# Patient Record
Sex: FEMALE | Race: WHITE | NOT HISPANIC OR LATINO | Employment: OTHER | URBAN - METROPOLITAN AREA
[De-identification: names, ages, dates, MRNs, and addresses within clinical notes are randomized per-mention and may not be internally consistent; named-entity substitution may affect disease eponyms.]

---

## 2017-03-13 ENCOUNTER — TELEPHONE (OUTPATIENT)
Dept: FAMILY MEDICINE | Facility: CLINIC | Age: 57
End: 2017-03-13

## 2017-03-13 NOTE — TELEPHONE ENCOUNTER
----- Message from Radha Ridley sent at 3/13/2017 12:42 PM CDT -----  Contact: Patient  Patient called to schedule an appointment; but she hasn't been there since 2013 so she would be considered a new patient. I advised her no providers in [place] are accepting new patients; she wants to know if Dr. Sepulveda can see her and if not then that's not good and she stated that her  comes to [place] too and she might just tell him to stop going there too.     She can be contacted at [phone].    Thanks,  Radha

## 2017-03-13 NOTE — TELEPHONE ENCOUNTER
Adv pt that she has not been seen in past 3 years and would be a new patient. Pt informed that Dr. Sepulveda is not taking new patients at this time.

## 2019-08-20 ENCOUNTER — OFFICE VISIT (OUTPATIENT)
Dept: FAMILY MEDICINE | Facility: CLINIC | Age: 59
End: 2019-08-20
Payer: COMMERCIAL

## 2019-08-20 VITALS
SYSTOLIC BLOOD PRESSURE: 138 MMHG | HEIGHT: 63 IN | WEIGHT: 175.81 LBS | DIASTOLIC BLOOD PRESSURE: 83 MMHG | HEART RATE: 86 BPM | BODY MASS INDEX: 31.15 KG/M2 | TEMPERATURE: 98 F

## 2019-08-20 DIAGNOSIS — H92.01 RIGHT EAR PAIN: Primary | ICD-10-CM

## 2019-08-20 DIAGNOSIS — Z00.01 ENCOUNTER FOR GENERAL ADULT MEDICAL EXAMINATION WITH ABNORMAL FINDINGS: ICD-10-CM

## 2019-08-20 DIAGNOSIS — Z12.31 ENCOUNTER FOR SCREENING MAMMOGRAM FOR BREAST CANCER: ICD-10-CM

## 2019-08-20 DIAGNOSIS — H66.001 RIGHT ACUTE SUPPURATIVE OTITIS MEDIA: ICD-10-CM

## 2019-08-20 DIAGNOSIS — Z13.6 ENCOUNTER FOR LIPID SCREENING FOR CARDIOVASCULAR DISEASE: ICD-10-CM

## 2019-08-20 DIAGNOSIS — Z12.11 ENCOUNTER FOR SCREENING COLONOSCOPY: ICD-10-CM

## 2019-08-20 DIAGNOSIS — Z13.220 ENCOUNTER FOR LIPID SCREENING FOR CARDIOVASCULAR DISEASE: ICD-10-CM

## 2019-08-20 DIAGNOSIS — Z11.59 NEED FOR HEPATITIS C SCREENING TEST: ICD-10-CM

## 2019-08-20 LAB
BILIRUB UR QL STRIP: NEGATIVE
CLARITY UR: CLEAR
COLOR UR: YELLOW
GLUCOSE UR QL STRIP: NEGATIVE
HGB UR QL STRIP: NEGATIVE
KETONES UR QL STRIP: NEGATIVE
LEUKOCYTE ESTERASE UR QL STRIP: NEGATIVE
NITRITE UR QL STRIP: NEGATIVE
PH UR STRIP: 6 [PH] (ref 5–8)
PROT UR QL STRIP: NEGATIVE
SP GR UR STRIP: 1.02 (ref 1–1.03)
URN SPEC COLLECT METH UR: NORMAL

## 2019-08-20 PROCEDURE — 3008F BODY MASS INDEX DOCD: CPT | Mod: CPTII,S$GLB,, | Performed by: FAMILY MEDICINE

## 2019-08-20 PROCEDURE — 99203 OFFICE O/P NEW LOW 30 MIN: CPT | Mod: S$GLB,,, | Performed by: FAMILY MEDICINE

## 2019-08-20 PROCEDURE — 99999 PR PBB SHADOW E&M-EST. PATIENT-LVL III: ICD-10-PCS | Mod: PBBFAC,,, | Performed by: FAMILY MEDICINE

## 2019-08-20 PROCEDURE — 99999 PR PBB SHADOW E&M-EST. PATIENT-LVL III: CPT | Mod: PBBFAC,,, | Performed by: FAMILY MEDICINE

## 2019-08-20 PROCEDURE — 81002 URINALYSIS NONAUTO W/O SCOPE: CPT | Mod: PO

## 2019-08-20 PROCEDURE — 3008F PR BODY MASS INDEX (BMI) DOCUMENTED: ICD-10-PCS | Mod: CPTII,S$GLB,, | Performed by: FAMILY MEDICINE

## 2019-08-20 PROCEDURE — 99203 PR OFFICE/OUTPT VISIT, NEW, LEVL III, 30-44 MIN: ICD-10-PCS | Mod: S$GLB,,, | Performed by: FAMILY MEDICINE

## 2019-08-20 RX ORDER — PROMETHAZINE HYDROCHLORIDE 25 MG/1
25 TABLET ORAL EVERY 6 HOURS PRN
Qty: 6 TABLET | Refills: 0 | Status: SHIPPED | OUTPATIENT
Start: 2019-08-20 | End: 2019-09-16

## 2019-08-20 RX ORDER — AMOXICILLIN 500 MG/1
500 CAPSULE ORAL EVERY 12 HOURS
Qty: 20 CAPSULE | Refills: 0 | Status: SHIPPED | OUTPATIENT
Start: 2019-08-20 | End: 2019-08-30

## 2019-08-20 RX ORDER — SODIUM, POTASSIUM,MAG SULFATES 17.5-3.13G
SOLUTION, RECONSTITUTED, ORAL ORAL
Qty: 354 ML | Refills: 0 | Status: SHIPPED | OUTPATIENT
Start: 2019-08-20 | End: 2019-10-17

## 2019-08-20 NOTE — PROGRESS NOTES
Subjective:      Patient ID: Jeannette Rahman is a 59 y.o. female.    Chief Complaint: Establish Care and Otalgia      Problem List Items Addressed This Visit     Encounter for screening colonoscopy    Overview     Due for colonoscopy         Encounter for screening mammogram for breast cancer    Overview     DUE for breast cancer screening         Need for hepatitis C screening test    Overview     Born in 1960         Encounter for lipid screening for cardiovascular disease    Overview     Lipid screening.         Encounter for general adult medical examination with abnormal findings    Overview     Will obtain labs this visit and follow-up in 2 weeks for wellness exam.         Right ear pain - Primary    Overview     Acute.  Worsening.  Mild to moderate.  No improvement with over-the-counter.         Current Assessment & Plan     Otitis media on exam.  Treating with antibiotics.  Follow-up if no improvement or worsening.         Right acute suppurative otitis media    Overview     Acute problem.  Right ear pain. Associated with decreased hearing.  Denies any fever chills.  Associated with headache.  Over-the-counter medications have not helped.  Patient has not been evaluated or treated for this condition.  Worsening.  Pain is mild to moderate         Current Assessment & Plan     Treat with amoxicillin.  Advised to use Flonase and amoxicillin for upper respiratory symptoms.  Anti-inflammatory or Tylenol for pain fever.  ER precautions were given.                Past Medical History:  Past Medical History:   Diagnosis Date    Arthritis     Colon polyp      Past Surgical History:   Procedure Laterality Date    bilateral carpal tunnel release      COLONOSCOPY  12/17/2013         HIP SURGERY      JOINT REPLACEMENT       Review of patient's allergies indicates:   Allergen Reactions    Sulfa (sulfonamide antibiotics)      Medication List with Changes/Refills   New Medications    AMOXICILLIN (AMOXIL) 500 MG  CAPSULE    Take 1 capsule (500 mg total) by mouth every 12 (twelve) hours. for 10 days    PROMETHAZINE (PHENERGAN) 25 MG TABLET    Take 1 tablet (25 mg total) by mouth every 6 (six) hours as needed for Nausea.    SODIUM,POTASSIUM,MAG SULFATES (SUPREP BOWEL PREP KIT) 17.5-3.13-1.6 GRAM SOLR    Take as instructed on prep sheet   Discontinued Medications    DULOXETINE (CYMBALTA) 30 MG CAPSULE    Take 1 capsule (30 mg total) by mouth 3 (three) times daily.    HYDROCODONE-ACETAMINOPHEN  (LORTAB)  MG PER TABLET        LOVASTATIN (MEVACOR) 10 MG TABLET    TAKE ONE TABLET BY MOUTH EVERY EVENING    OXYCODONE-ACETAMINOPHEN 5-325 MG (PERCOCET) 5-325 MG PER TABLET        TRAMADOL (ULTRAM) 50 MG TABLET    Take 1 tablet (50 mg total) by mouth every 8 (eight) hours as needed for Pain.    WARFARIN (COUMADIN) 5 MG TABLET    Take 1 tablet (5 mg total) by mouth once.      Social History     Tobacco Use    Smoking status: Former Smoker     Years: 2.00    Smokeless tobacco: Never Used   Substance Use Topics    Alcohol use: Yes     Comment: socially      Family History   Problem Relation Age of Onset    Anesthesia problems Neg Hx     Clotting disorder Neg Hx     Alzheimer's disease Mother     Heart disease Father        I have reviewed the complete PMH, social history, surgical history, allergies and medications.  As well as family history.    Review of Systems   Constitutional: Negative for activity change and unexpected weight change.   HENT: Positive for ear pain, hearing loss and postnasal drip. Negative for ear discharge, rhinorrhea and trouble swallowing.    Eyes: Negative for discharge and visual disturbance.   Respiratory: Negative for chest tightness and wheezing.    Cardiovascular: Negative for chest pain and palpitations.   Gastrointestinal: Negative for blood in stool, constipation, diarrhea and vomiting.   Endocrine: Negative for polydipsia and polyuria.   Genitourinary: Negative for difficulty urinating,  "dysuria, hematuria and menstrual problem.   Musculoskeletal: Negative for arthralgias, joint swelling and neck pain.   Neurological: Negative for weakness and headaches.   Psychiatric/Behavioral: Negative for confusion and dysphoric mood.       Objective:     /83   Pulse 86   Temp 98.3 °F (36.8 °C) (Oral)   Ht 5' 3" (1.6 m)   Wt 79.7 kg (175 lb 12.8 oz)   BMI 31.14 kg/m²     Physical Exam   Constitutional: She is oriented to person, place, and time. She appears well-developed and well-nourished. No distress.   HENT:   Head: Normocephalic and atraumatic.   Right Ear: There is tenderness. Tympanic membrane is injected, scarred, erythematous and bulging. Tympanic membrane is not perforated and not retracted. A middle ear effusion is present. Decreased hearing is noted.   Left Ear: No tenderness. Tympanic membrane is scarred. Tympanic membrane is not injected and not erythematous. A middle ear effusion is present.   Eyes: Pupils are equal, round, and reactive to light. EOM are normal.   Neck: Normal range of motion. Neck supple.   Cardiovascular: Normal rate, regular rhythm, normal heart sounds and intact distal pulses.   No murmur heard.  Pulmonary/Chest: Effort normal and breath sounds normal. No respiratory distress. She has no wheezes.   Abdominal: Soft. Bowel sounds are normal. She exhibits no distension. There is no tenderness.   Musculoskeletal: Normal range of motion. She exhibits no edema.   Neurological: She is alert and oriented to person, place, and time. No cranial nerve deficit.   Skin: Skin is warm and dry. Capillary refill takes less than 2 seconds.   Psychiatric: She has a normal mood and affect. Her behavior is normal.   Nursing note and vitals reviewed.      Assessment:     1. Right ear pain    2. Right acute suppurative otitis media    3. Encounter for screening mammogram for breast cancer    4. Need for hepatitis C screening test    5. Encounter for lipid screening for cardiovascular " disease    6. Encounter for screening colonoscopy    7. Encounter for general adult medical examination with abnormal findings        Plan:     I have Reviewed and summarized old records.  I have performed thorough medication reconciliation today and discussed risk and benefits of each medication.  I have ordered labs and discussed with patient.  All questions were answered.  I am requesting old records and will review them once they are available.    Problem List Items Addressed This Visit     Encounter for screening colonoscopy    Relevant Medications    promethazine (PHENERGAN) 25 MG tablet    sodium,potassium,mag sulfates (SUPREP BOWEL PREP KIT) 17.5-3.13-1.6 gram SolR    Other Relevant Orders    Case request GI: COLONOSCOPY (Completed)    Encounter for screening mammogram for breast cancer    Relevant Orders    Mammo Digital Screening Bilat with CAD    Need for hepatitis C screening test    Relevant Orders    Hepatitis C antibody    Encounter for lipid screening for cardiovascular disease    Relevant Orders    Lipid panel (Completed)    Encounter for general adult medical examination with abnormal findings    Relevant Orders    CBC auto differential (Completed)    Comprehensive metabolic panel (Completed)    Hemoglobin A1c (Completed)    TSH (Completed)    T3, free (Completed)    T4 (Completed)    URINALYSIS (Completed)    Right ear pain - Primary     Otitis media on exam.  Treating with antibiotics.  Follow-up if no improvement or worsening.         Right acute suppurative otitis media     Treat with amoxicillin.  Advised to use Flonase and amoxicillin for upper respiratory symptoms.  Anti-inflammatory or Tylenol for pain fever.  ER precautions were given.         Relevant Medications    amoxicillin (AMOXIL) 500 MG capsule          Follow up in about 2 weeks (around 9/3/2019), or if symptoms worsen or fail to improve, for Lab results .    If no improvement in symptoms or symptoms worsen, advised to  call/follow-up at clinic or go to ER. Patient voiced understanding and all questions/concerns were addressed.     DISCLAIMER: This note was compiled by using a speech recognition dictation system and therefore please be aware that typographical / speech recognition errors can and do occur.  Please contact me if you see any errors specifically.    Rick Vivas MD  We Offer Telehealth & Same Day Appointments!   Book your Telehealth appointment with me through my nurse or   Clinic appointments on [employer]!    Office: [phone]          Check out my Facebook Page and Follow Me at: CLICK HERE    Check out my website at [employer] by clicking on: CLICK HERE    To Schedule appointments online, go to [employer]: CLICK HERE     Location: [email or web address]    [address] [place] [address]    FAX: [phone]

## 2019-08-20 NOTE — PATIENT INSTRUCTIONS

## 2019-08-21 ENCOUNTER — LAB VISIT (OUTPATIENT)
Dept: LAB | Facility: HOSPITAL | Age: 59
End: 2019-08-21
Attending: FAMILY MEDICINE
Payer: COMMERCIAL

## 2019-08-21 DIAGNOSIS — Z00.01 ENCOUNTER FOR GENERAL ADULT MEDICAL EXAMINATION WITH ABNORMAL FINDINGS: ICD-10-CM

## 2019-08-21 DIAGNOSIS — Z13.220 ENCOUNTER FOR LIPID SCREENING FOR CARDIOVASCULAR DISEASE: ICD-10-CM

## 2019-08-21 DIAGNOSIS — Z11.59 NEED FOR HEPATITIS C SCREENING TEST: ICD-10-CM

## 2019-08-21 DIAGNOSIS — Z13.6 ENCOUNTER FOR LIPID SCREENING FOR CARDIOVASCULAR DISEASE: ICD-10-CM

## 2019-08-21 LAB
ALBUMIN SERPL BCP-MCNC: 3.8 G/DL (ref 3.5–5.2)
ALP SERPL-CCNC: 86 U/L (ref 55–135)
ALT SERPL W/O P-5'-P-CCNC: 41 U/L (ref 10–44)
ANION GAP SERPL CALC-SCNC: 9 MMOL/L (ref 8–16)
AST SERPL-CCNC: 24 U/L (ref 10–40)
BASOPHILS # BLD AUTO: 0.07 K/UL (ref 0–0.2)
BASOPHILS NFR BLD: 0.9 % (ref 0–1.9)
BILIRUB SERPL-MCNC: 0.4 MG/DL (ref 0.1–1)
BUN SERPL-MCNC: 12 MG/DL (ref 6–20)
CALCIUM SERPL-MCNC: 10.1 MG/DL (ref 8.7–10.5)
CHLORIDE SERPL-SCNC: 106 MMOL/L (ref 95–110)
CHOLEST SERPL-MCNC: 329 MG/DL (ref 120–199)
CHOLEST/HDLC SERPL: 7.5 {RATIO} (ref 2–5)
CO2 SERPL-SCNC: 27 MMOL/L (ref 23–29)
CREAT SERPL-MCNC: 0.8 MG/DL (ref 0.5–1.4)
DIFFERENTIAL METHOD: ABNORMAL
EOSINOPHIL # BLD AUTO: 0.1 K/UL (ref 0–0.5)
EOSINOPHIL NFR BLD: 1.7 % (ref 0–8)
ERYTHROCYTE [DISTWIDTH] IN BLOOD BY AUTOMATED COUNT: 12.5 % (ref 11.5–14.5)
EST. GFR  (AFRICAN AMERICAN): >60 ML/MIN/1.73 M^2
EST. GFR  (NON AFRICAN AMERICAN): >60 ML/MIN/1.73 M^2
ESTIMATED AVG GLUCOSE: 134 MG/DL (ref 68–131)
GLUCOSE SERPL-MCNC: 124 MG/DL (ref 70–110)
HBA1C MFR BLD HPLC: 6.3 % (ref 4–5.6)
HCT VFR BLD AUTO: 45.7 % (ref 37–48.5)
HDLC SERPL-MCNC: 44 MG/DL (ref 40–75)
HDLC SERPL: 13.4 % (ref 20–50)
HGB BLD-MCNC: 14.5 G/DL (ref 12–16)
IMM GRANULOCYTES # BLD AUTO: 0.04 K/UL (ref 0–0.04)
IMM GRANULOCYTES NFR BLD AUTO: 0.5 % (ref 0–0.5)
LDLC SERPL CALC-MCNC: 229.8 MG/DL (ref 63–159)
LYMPHOCYTES # BLD AUTO: 3 K/UL (ref 1–4.8)
LYMPHOCYTES NFR BLD: 40 % (ref 18–48)
MCH RBC QN AUTO: 31 PG (ref 27–31)
MCHC RBC AUTO-ENTMCNC: 31.7 G/DL (ref 32–36)
MCV RBC AUTO: 98 FL (ref 82–98)
MONOCYTES # BLD AUTO: 0.6 K/UL (ref 0.3–1)
MONOCYTES NFR BLD: 7.6 % (ref 4–15)
NEUTROPHILS # BLD AUTO: 3.7 K/UL (ref 1.8–7.7)
NEUTROPHILS NFR BLD: 49.3 % (ref 38–73)
NONHDLC SERPL-MCNC: 285 MG/DL
NRBC BLD-RTO: 0 /100 WBC
PLATELET # BLD AUTO: 421 K/UL (ref 150–350)
PMV BLD AUTO: 9.3 FL (ref 9.2–12.9)
POTASSIUM SERPL-SCNC: 4.6 MMOL/L (ref 3.5–5.1)
PROT SERPL-MCNC: 7.2 G/DL (ref 6–8.4)
RBC # BLD AUTO: 4.67 M/UL (ref 4–5.4)
SODIUM SERPL-SCNC: 142 MMOL/L (ref 136–145)
T3FREE SERPL-MCNC: 2.6 PG/ML (ref 2.3–4.2)
T4 FREE SERPL-MCNC: 0.74 NG/DL (ref 0.71–1.51)
T4 SERPL-MCNC: 4.1 UG/DL (ref 4.5–11.5)
TRIGL SERPL-MCNC: 276 MG/DL (ref 30–150)
TSH SERPL DL<=0.005 MIU/L-ACNC: 10.31 UIU/ML (ref 0.4–4)
WBC # BLD AUTO: 7.5 K/UL (ref 3.9–12.7)

## 2019-08-21 PROCEDURE — 84439 ASSAY OF FREE THYROXINE: CPT

## 2019-08-21 PROCEDURE — 84436 ASSAY OF TOTAL THYROXINE: CPT

## 2019-08-21 PROCEDURE — 84443 ASSAY THYROID STIM HORMONE: CPT

## 2019-08-21 PROCEDURE — 86803 HEPATITIS C AB TEST: CPT

## 2019-08-21 PROCEDURE — 36415 COLL VENOUS BLD VENIPUNCTURE: CPT | Mod: PO

## 2019-08-21 PROCEDURE — 85025 COMPLETE CBC W/AUTO DIFF WBC: CPT

## 2019-08-21 PROCEDURE — 84481 FREE ASSAY (FT-3): CPT

## 2019-08-21 PROCEDURE — 83036 HEMOGLOBIN GLYCOSYLATED A1C: CPT

## 2019-08-21 PROCEDURE — 80053 COMPREHEN METABOLIC PANEL: CPT

## 2019-08-21 PROCEDURE — 80061 LIPID PANEL: CPT

## 2019-08-21 NOTE — PROGRESS NOTES
Your urinalysis is normal.      If you have any other tests that were ordered we will notify you once they are available.  Please let me know if you have any questions concerning this test.  We will discuss further at your next office appointment.    Also please see below for health maintenance items that are due so we may discuss those at your next office visit:    Hepatitis C Screening due on 1960  TETANUS VACCINE due on 05/30/1977  Pap Smear with HPV Cotest due on 05/30/1980  Mammogram due on 05/30/1999  Lipid Panel due on 08/08/2018  Colonoscopy due on 12/17/2018    Rick Vivas MD  We Offer Telehealth & Same Day Appointments!   Book your Telehealth appointment with me through my nurse or   Clinic appointments on [facility]!  Sehkrs-[phone]     Check out my Facebook Page and Follow Me at: [email or web address]    Check out my website at [employer] by clicking on: [email or web address]    To Schedule appointments online, go to [facility]: [email or web address]

## 2019-08-22 ENCOUNTER — TELEPHONE (OUTPATIENT)
Dept: FAMILY MEDICINE | Facility: CLINIC | Age: 59
End: 2019-08-22

## 2019-08-22 DIAGNOSIS — E78.5 HYPERLIPIDEMIA, UNSPECIFIED HYPERLIPIDEMIA TYPE: Primary | ICD-10-CM

## 2019-08-22 DIAGNOSIS — R73.03 PREDIABETES: ICD-10-CM

## 2019-08-22 DIAGNOSIS — D75.839 THROMBOCYTOSIS: ICD-10-CM

## 2019-08-22 DIAGNOSIS — E03.9 HYPOTHYROIDISM, UNSPECIFIED TYPE: ICD-10-CM

## 2019-08-22 LAB — HCV AB SERPL QL IA: NEGATIVE

## 2019-08-22 RX ORDER — METFORMIN HYDROCHLORIDE 500 MG/1
500 TABLET ORAL
COMMUNITY
End: 2019-08-22 | Stop reason: SDUPTHER

## 2019-08-22 RX ORDER — ATORVASTATIN CALCIUM 40 MG/1
40 TABLET, FILM COATED ORAL DAILY
Qty: 30 TABLET | Refills: 3 | Status: SHIPPED | OUTPATIENT
Start: 2019-08-22 | End: 2019-09-16

## 2019-08-22 RX ORDER — LEVOTHYROXINE SODIUM 25 UG/1
25 TABLET ORAL DAILY
COMMUNITY
End: 2019-08-22 | Stop reason: SDUPTHER

## 2019-08-22 RX ORDER — METFORMIN HYDROCHLORIDE 500 MG/1
500 TABLET ORAL
Qty: 30 TABLET | Refills: 3 | Status: SHIPPED | OUTPATIENT
Start: 2019-08-22 | End: 2019-11-19 | Stop reason: SDUPTHER

## 2019-08-22 RX ORDER — LEVOTHYROXINE SODIUM 25 UG/1
25 TABLET ORAL DAILY
Qty: 30 TABLET | Refills: 3 | Status: SHIPPED | OUTPATIENT
Start: 2019-08-22 | End: 2019-11-19 | Stop reason: SDUPTHER

## 2019-08-22 RX ORDER — ATORVASTATIN CALCIUM 40 MG/1
40 TABLET, FILM COATED ORAL DAILY
COMMUNITY
End: 2019-08-22 | Stop reason: SDUPTHER

## 2019-08-22 NOTE — PROGRESS NOTES
Start Synthroid 25 mcg and repeat thyroid labs in 6 weeks.  Start atorvastatin 40 mg repeat lipid panel in 3 months.  Start metformin 500 mg repeat A1c 3 months.  Repeat CBC for thrombocytosis with thyroid labs    Make sure patient makes follow-up appointment.  See note below.    Please call the patient to make sure that they received the results through [employer].  I have sent a message to them with the interpretation.  See if they have any questions and make a follow-up appointment if not already schedule and if needed.    I have reviewed your recent blood work.  You of multiple lab abnormalities that need to be discussed in detail.  Please make follow-up appointment so we can treat these abnormalities.  Your complete blood count is abnormal with elevated platelet count which is increased from previous reading.  Otherwise stable within normal limits.    Your metabolic panel which shows a glucose kidney function electrolytes and liver function is abnormal with elevated glucose.  Otherwise stable within normal limits  Thyroid studies are abnormal showing hypothyroidism.  Have you ever been on Synthroid before?.   Your cholesterol is very abnormal and elevated.  Your ratio is very high indicating high risk of stroke and heart attack.  You need to be on medication to reduce her LDL and inflammation from cholesterol..      Your hemoglobin A1c is borderline type 2 diabetic.  Serious lifestyle modification with diet and exercise.  You also likely need to be on metformin to reduce your chance of becoming type 2 diabetic

## 2019-08-22 NOTE — ASSESSMENT & PLAN NOTE
Treat with amoxicillin.  Advised to use Flonase and amoxicillin for upper respiratory symptoms.  Anti-inflammatory or Tylenol for pain fever.  ER precautions were given.

## 2019-08-22 NOTE — TELEPHONE ENCOUNTER
----- Message from Jennifer Rogers sent at 8/22/2019 11:13 AM CDT -----  Contact: self/[phone]  Type:  Patient Returning Call    Who Called:Jeannette Rahman    Who Left Message for Patient:nurse  Does the patient know what this is regarding?:need a call back   Would the patient rather a call back or a response via [facility]? Call back  Best Call Back Number:[phone]  Additional Information:

## 2019-08-22 NOTE — TELEPHONE ENCOUNTER
----- Message from Rick Vivas MD sent at 8/21/2019  9:30 PM CDT -----  Start Synthroid 25 mcg and repeat thyroid labs in 6 weeks.  Start atorvastatin 40 mg repeat lipid panel in 3 months.  Start metformin 500 mg repeat A1c 3 months.  Repeat CBC for thrombocytosis with thyroid labs    Make sure patient makes follow-up appointment.  See note below.    Please call the patient to make sure that they received the results through [employer].  I have sent a message to them with the interpretation.  See if they have any questions and make a follow-up appointment if not already schedule and if needed.    I have reviewed your recent blood work.  You of multiple lab abnormalities that need to be discussed in detail.  Please make follow-up appointment so we can treat these abnormalities.  Your complete blood count is abnormal with elevated platelet count which is increased from previous reading.  Otherwise stable within normal limits.    Your metabolic panel which shows a glucose kidney function electrolytes and liver function is abnormal with elevated glucose.  Otherwise stable within normal limits  Thyroid studies are abnormal showing hypothyroidism.  Have you ever been on Synthroid before?.   Your cholesterol is very abnormal and elevated.  Your ratio is very high indicating high risk of stroke and heart attack.  You need to be on medication to reduce her LDL and inflammation from cholesterol..      Your hemoglobin A1c is borderline type 2 diabetic.  Serious lifestyle modification with diet and exercise.  You also likely need to be on metformin to reduce your chance of becoming type 2 diabetic

## 2019-09-16 ENCOUNTER — OFFICE VISIT (OUTPATIENT)
Dept: FAMILY MEDICINE | Facility: CLINIC | Age: 59
End: 2019-09-16
Payer: COMMERCIAL

## 2019-09-16 VITALS
HEIGHT: 63 IN | TEMPERATURE: 98 F | HEART RATE: 80 BPM | WEIGHT: 172.63 LBS | DIASTOLIC BLOOD PRESSURE: 80 MMHG | SYSTOLIC BLOOD PRESSURE: 116 MMHG | BODY MASS INDEX: 30.59 KG/M2

## 2019-09-16 DIAGNOSIS — R73.03 PREDIABETES: ICD-10-CM

## 2019-09-16 DIAGNOSIS — E78.5 HYPERLIPIDEMIA, UNSPECIFIED HYPERLIPIDEMIA TYPE: ICD-10-CM

## 2019-09-16 DIAGNOSIS — I65.29 CAROTID ARTERY CALCIFICATION, UNSPECIFIED LATERALITY: ICD-10-CM

## 2019-09-16 DIAGNOSIS — E03.9 ACQUIRED HYPOTHYROIDISM: ICD-10-CM

## 2019-09-16 DIAGNOSIS — Z87.42 HX OF ABNORMAL CERVICAL PAP SMEAR: ICD-10-CM

## 2019-09-16 DIAGNOSIS — Z79.899 ENCOUNTER FOR LONG-TERM (CURRENT) USE OF MEDICATIONS: Primary | ICD-10-CM

## 2019-09-16 PROBLEM — R09.89 CAROTID BRUIT: Status: ACTIVE | Noted: 2019-09-16

## 2019-09-16 PROCEDURE — 3008F PR BODY MASS INDEX (BMI) DOCUMENTED: ICD-10-PCS | Mod: CPTII,S$GLB,, | Performed by: FAMILY MEDICINE

## 2019-09-16 PROCEDURE — 99999 PR PBB SHADOW E&M-EST. PATIENT-LVL IV: ICD-10-PCS | Mod: PBBFAC,,, | Performed by: FAMILY MEDICINE

## 2019-09-16 PROCEDURE — 99214 PR OFFICE/OUTPT VISIT, EST, LEVL IV, 30-39 MIN: ICD-10-PCS | Mod: S$GLB,,, | Performed by: FAMILY MEDICINE

## 2019-09-16 PROCEDURE — 3008F BODY MASS INDEX DOCD: CPT | Mod: CPTII,S$GLB,, | Performed by: FAMILY MEDICINE

## 2019-09-16 PROCEDURE — 99214 OFFICE O/P EST MOD 30 MIN: CPT | Mod: S$GLB,,, | Performed by: FAMILY MEDICINE

## 2019-09-16 PROCEDURE — 99999 PR PBB SHADOW E&M-EST. PATIENT-LVL IV: CPT | Mod: PBBFAC,,, | Performed by: FAMILY MEDICINE

## 2019-09-16 RX ORDER — ATORVASTATIN CALCIUM 80 MG/1
80 TABLET, FILM COATED ORAL DAILY
Qty: 90 TABLET | Refills: 3 | Status: SHIPPED | OUTPATIENT
Start: 2019-09-16 | End: 2020-03-05 | Stop reason: SDUPTHER

## 2019-09-16 RX ORDER — ASPIRIN 81 MG/1
81 TABLET ORAL DAILY
Qty: 90 TABLET | Refills: 3 | Status: SHIPPED | OUTPATIENT
Start: 2019-09-16 | End: 2021-03-01

## 2019-09-16 NOTE — ASSESSMENT & PLAN NOTE
Patient has multiple lab abnormalities which were addressed today.  Patient has new finding of thrombocytosis.  Patient's platelet count has been increasing over the last few readings.  Will continue to monitor.  Will refer to Hematology Oncology if needed.  Patient denies any issues with bleeding or bruising.    Patient also has hypothyroidism which is addressed today.  Patient also has uncontrolled hyperlipidemia which is being addressed.    Complete history and physical was completed today.  Complete and thorough medication reconciliation was performed.  Discussed risks and benefits of medications.  Advised patient on orders and health maintenance.  We discussed old records and old labs if available.  Will request any records not available through [facility].  Continue current medications listed on your summary sheet.

## 2019-09-16 NOTE — ASSESSMENT & PLAN NOTE
Patient has been compliant with metformin.  Will recheck level in 2 months.  Patient was tablets with eye doctor.  No issues with feet currently.  Pneumonia vaccine is due.    Monitor hemoglobin A1c.  Discussed diabetic diet and exercise protocol.  Continue medications.  Monitor for side effects.  Discussed checking blood glucose.  Discussed symptoms to monitor for and to notify me immediately if persistent or worsening.  Follow up with Ophthalmology/Optometry and Podiatry.

## 2019-09-16 NOTE — ASSESSMENT & PLAN NOTE
Patient started on aspirin this visit.  Patient also started on atorvastatin recently for cholesterol.  Patient is high risk for atherosclerosis.  Patient was previous smoker.

## 2019-09-16 NOTE — PATIENT INSTRUCTIONS

## 2019-09-16 NOTE — PROGRESS NOTES
Subjective:      Patient ID: Jeannette Rahman is a 59 y.o. female.    Chief Complaint: Follow-up (follow up right ear pain and review labs, patient also wants discuss a white spot found on an xray)      Problem List Items Addressed This Visit     Encounter for long-term (current) use of medications - Primary    Overview     09/16/2019   Patient is on CHRONIC long-term drug therapy for managed conditions. See medication list. Reports compliance.  No side effects reported.  Routine lab work is being monitored.  Patient does  need refills today.     Lab Results   Component Value Date    WBC 7.50 08/21/2019    HGB 14.5 08/21/2019    HCT 45.7 08/21/2019    MCV 98 08/21/2019     (H) 08/21/2019      CMP  Sodium   Date Value Ref Range Status   08/21/2019 142 136 - 145 mmol/L Final     Potassium   Date Value Ref Range Status   08/21/2019 4.6 3.5 - 5.1 mmol/L Final     Chloride   Date Value Ref Range Status   08/21/2019 106 95 - 110 mmol/L Final     CO2   Date Value Ref Range Status   08/21/2019 27 23 - 29 mmol/L Final     Glucose   Date Value Ref Range Status   08/21/2019 124 (H) 70 - 110 mg/dL Final     BUN, Bld   Date Value Ref Range Status   08/21/2019 12 6 - 20 mg/dL Final     Creatinine   Date Value Ref Range Status   08/21/2019 0.8 0.5 - 1.4 mg/dL Final     Calcium   Date Value Ref Range Status   08/21/2019 10.1 8.7 - 10.5 mg/dL Final     Total Protein   Date Value Ref Range Status   08/21/2019 7.2 6.0 - 8.4 g/dL Final     Albumin   Date Value Ref Range Status   08/21/2019 3.8 3.5 - 5.2 g/dL Final     Total Bilirubin   Date Value Ref Range Status   08/21/2019 0.4 0.1 - 1.0 mg/dL Final     Comment:     For infants and newborns, interpretation of results should be based  on gestational age, weight and in agreement with clinical  observations.  Premature Infant recommended reference ranges:  Up to 24 hours.............<8.0 mg/dL  Up to 48 hours............<12.0 mg/dL  3-5 days..................<15.0 mg/dL  6-29  days.................<15.0 mg/dL       Alkaline Phosphatase   Date Value Ref Range Status   08/21/2019 86 55 - 135 U/L Final     AST   Date Value Ref Range Status   08/21/2019 24 10 - 40 U/L Final     ALT   Date Value Ref Range Status   08/21/2019 41 10 - 44 U/L Final     Anion Gap   Date Value Ref Range Status   08/21/2019 9 8 - 16 mmol/L Final     eGFR if    Date Value Ref Range Status   08/21/2019 >60.0 >60 mL/min/1.73 m^2 Final     eGFR if non    Date Value Ref Range Status   08/21/2019 >60.0 >60 mL/min/1.73 m^2 Final     Comment:     Calculation used to obtain the estimated glomerular filtration  rate (eGFR) is the CKD-EPI equation.        Lab Results   Component Value Date    TSH 10.314 (H) 08/21/2019               Current Assessment & Plan     Patient has multiple lab abnormalities which were addressed today.  Patient has new finding of thrombocytosis.  Patient's platelet count has been increasing over the last few readings.  Will continue to monitor.  Will refer to Hematology Oncology if needed.  Patient denies any issues with bleeding or bruising.    Patient also has hypothyroidism which is addressed today.  Patient also has uncontrolled hyperlipidemia which is being addressed.    Complete history and physical was completed today.  Complete and thorough medication reconciliation was performed.  Discussed risks and benefits of medications.  Advised patient on orders and health maintenance.  We discussed old records and old labs if available.  Will request any records not available through epic.  Continue current medications listed on your summary sheet.           Acquired hypothyroidism    Overview     Thyroid ROS: fatigue, weight gain, feeling cold and cold intolerance and anxiousness.   Lab Results   Component Value Date    TSH 10.314 (H) 08/21/2019     Lab Results   Component Value Date    TSH 10.314 (H) 08/21/2019    Z4XOGVS 4.1 (L) 08/21/2019    FREET4 0.74 08/21/2019        Exam: thyroid is normal in size without nodules or tenderness.            Current Assessment & Plan     Assessment:  hypothyroidism control uncertain and needs improvement.   Plan: current treatment plan effective, no change in therapy  following changes made to the medical regimen - continue Synthroid 25 mcg recheck level 6 to 8 weeks.    Discussed risks and benefits of medication use.  ER precautions were given.         Hyperlipidemia    Overview     Chronic.  Uncontrolled.  Patient not on statin previously.  Of note patient has had carotid calcification noted on recent imaging at dentist.  Checking bilateral carotid ultrasound for screening.  Patient has not established with a cardiologist.  Patient denies any chest pain shortness of breath blurry vision lightheaded dizziness etc.  Lab Results   Component Value Date    CHOL 329 (H) 08/21/2019    CHOL 221 (H) 08/08/2013    CHOL 313 (H) 05/06/2013     Lab Results   Component Value Date    HDL 44 08/21/2019    HDL 37 (L) 08/08/2013    HDL 64 05/06/2013     Lab Results   Component Value Date    LDLCALC 229.8 (H) 08/21/2019    LDLCALC 138.0 08/08/2013    LDLCALC 210.0 (H) 05/06/2013     Lab Results   Component Value Date    TRIG 276 (H) 08/21/2019    TRIG 230 (H) 08/08/2013    TRIG 197 (H) 05/06/2013     Lab Results   Component Value Date    CHOLHDL 13.4 (L) 08/21/2019    CHOLHDL 16.7 (L) 08/08/2013    CHOLHDL 20.4 05/06/2013              Current Assessment & Plan     We had a long discussion today about implications of untreated cholesterol.  Patient is tolerating atorvastatin 40 mg well.  Will increased to max dose of 80 mg and recheck cholesterol in 2 months.    Discussed hyperlipidemia disease course.  Discussed the risk of cardiovascular disease, increase stroke and heart attack risk.  Patient voiced understanding and understood the treatment plan. All questions were answered.  Discussed healthy diet and increased need for exercise.           Prediabetes     Overview     09/16/2019   Chronic. Unstable.     Lab Results   Component Value Date    HGBA1C 6.3 (H) 08/21/2019        Patient taking Metformin daily. No side effects.     Med Compliance? Yes   Eye DrNaye? Needs updating   Ft exam? No issues   Pneumonia Vaccine? Not update          Current Assessment & Plan     Patient has been compliant with metformin.  Will recheck level in 2 months.  Patient was tablets with eye doctor.  No issues with feet currently.  Pneumonia vaccine is due.    Monitor hemoglobin A1c.  Discussed diabetic diet and exercise protocol.  Continue medications.  Monitor for side effects.  Discussed checking blood glucose.  Discussed symptoms to monitor for and to notify me immediately if persistent or worsening.  Follow up with Ophthalmology/Optometry and Podiatry.           Hx of abnormal cervical Pap smear    Overview     Patient requesting referral to a new gyn.  Patient has not been seen recently.    Patient also has a mammogram order that is pending.         Current Assessment & Plan     Updating mammogram and Pap smear.  Referral to gyn.         Carotid artery calcification    Overview     New problem.  Patient reports that she had carotid artery calcification on imaging at dentist recently.  Patient has elevated cholesterol.  Patient needs carotid ultrasounds screening.         Current Assessment & Plan     Patient started on aspirin this visit.  Patient also started on atorvastatin recently for cholesterol.  Patient is high risk for atherosclerosis.  Patient was previous smoker.                Past Medical History:  Past Medical History:   Diagnosis Date    Arthritis     Colon polyp      Past Surgical History:   Procedure Laterality Date    bilateral carpal tunnel release      COLONOSCOPY  12/17/2013         HIP SURGERY      JOINT REPLACEMENT       Review of patient's allergies indicates:   Allergen Reactions    Sulfa (sulfonamide antibiotics)      Medication List with Changes/Refills    New Medications    ASPIRIN (ECOTRIN) 81 MG EC TABLET    Take 1 tablet (81 mg total) by mouth once daily.    ATORVASTATIN (LIPITOR) 80 MG TABLET    Take 1 tablet (80 mg total) by mouth once daily.   Current Medications    LEVOTHYROXINE (SYNTHROID) 25 MCG TABLET    Take 1 tablet (25 mcg total) by mouth once daily.    METFORMIN (GLUCOPHAGE) 500 MG TABLET    Take 1 tablet (500 mg total) by mouth daily with breakfast.    SODIUM,POTASSIUM,MAG SULFATES (SUPREP BOWEL PREP KIT) 17.5-3.13-1.6 GRAM SOLR    Take as instructed on prep sheet   Discontinued Medications    ATORVASTATIN (LIPITOR) 40 MG TABLET    Take 1 tablet (40 mg total) by mouth once daily.    PROMETHAZINE (PHENERGAN) 25 MG TABLET    Take 1 tablet (25 mg total) by mouth every 6 (six) hours as needed for Nausea.      Social History     Tobacco Use    Smoking status: Former Smoker     Packs/day: 0.00     Years: 2.00     Pack years: 0.00    Smokeless tobacco: Never Used   Substance Use Topics    Alcohol use: Yes     Comment: socially      Family History   Problem Relation Age of Onset    Alzheimer's disease Mother     Arthritis Mother     Heart disease Father     Diabetes Father     Cancer Maternal Grandfather     Anesthesia problems Neg Hx     Clotting disorder Neg Hx        I have reviewed the complete PMH, social history, surgical history, allergies and medications.  As well as family history.    Review of Systems   Constitutional: Positive for fatigue. Negative for activity change and unexpected weight change.   HENT: Negative for hearing loss, rhinorrhea and trouble swallowing.    Eyes: Negative for discharge and visual disturbance.   Respiratory: Negative for chest tightness and wheezing.    Cardiovascular: Negative for chest pain and palpitations.   Gastrointestinal: Negative for blood in stool, constipation, diarrhea and vomiting.   Endocrine: Positive for heat intolerance. Negative for polydipsia and polyuria.   Genitourinary: Negative for  "difficulty urinating, dysuria, hematuria and menstrual problem.   Musculoskeletal: Negative for arthralgias, joint swelling and neck pain.   Neurological: Negative for weakness and headaches.   Psychiatric/Behavioral: Negative for confusion and dysphoric mood.       Objective:     /80   Pulse 80   Temp 97.7 °F (36.5 °C) (Oral)   Ht 5' 3" (1.6 m)   Wt 78.3 kg (172 lb 9.6 oz)   BMI 30.57 kg/m²     Physical Exam   Constitutional: She is oriented to person, place, and time. She appears well-developed and well-nourished. No distress.   HENT:   Head: Normocephalic and atraumatic.   Eyes: Pupils are equal, round, and reactive to light. EOM are normal.   Neck: Normal range of motion. Neck supple. Carotid bruit is not present. No thyroid mass and no thyromegaly present.   Cardiovascular: Normal rate, regular rhythm, normal heart sounds and intact distal pulses.   No murmur heard.  Pulmonary/Chest: Effort normal and breath sounds normal. No respiratory distress. She has no wheezes.   Abdominal: Soft. Bowel sounds are normal. She exhibits no distension.   Musculoskeletal: Normal range of motion. She exhibits no edema.        Right foot: There is normal range of motion and no deformity.        Left foot: There is normal range of motion and no deformity.   Feet:   Right Foot:   Skin Integrity: Negative for ulcer, blister, skin breakdown, erythema, warmth, callus or dry skin.   Left Foot:   Skin Integrity: Negative for ulcer, blister, skin breakdown, erythema, warmth, callus or dry skin.   Neurological: She is alert and oriented to person, place, and time. No cranial nerve deficit.   Skin: Skin is warm and dry. Capillary refill takes less than 2 seconds.   Psychiatric: She has a normal mood and affect. Her behavior is normal.   Nursing note and vitals reviewed.      Assessment:     1. Encounter for long-term (current) use of medications    2. Acquired hypothyroidism    3. Hyperlipidemia, unspecified hyperlipidemia type "    4. Prediabetes    5. Hx of abnormal cervical Pap smear    6. Carotid artery calcification, unspecified laterality        Plan:     I have Reviewed and summarized old records.  I have performed thorough medication reconciliation today and discussed risk and benefits of each medication.  I have reviewed labs and discussed with patient.  All questions were answered.  I am requesting old records and will review them once they are available.    Problem List Items Addressed This Visit     Encounter for long-term (current) use of medications - Primary     Patient has multiple lab abnormalities which were addressed today.  Patient has new finding of thrombocytosis.  Patient's platelet count has been increasing over the last few readings.  Will continue to monitor.  Will refer to Hematology Oncology if needed.  Patient denies any issues with bleeding or bruising.    Patient also has hypothyroidism which is addressed today.  Patient also has uncontrolled hyperlipidemia which is being addressed.    Complete history and physical was completed today.  Complete and thorough medication reconciliation was performed.  Discussed risks and benefits of medications.  Advised patient on orders and health maintenance.  We discussed old records and old labs if available.  Will request any records not available through epic.  Continue current medications listed on your summary sheet.           Acquired hypothyroidism     Assessment:  hypothyroidism control uncertain and needs improvement.   Plan: current treatment plan effective, no change in therapy  following changes made to the medical regimen - continue Synthroid 25 mcg recheck level 6 to 8 weeks.    Discussed risks and benefits of medication use.  ER precautions were given.         Hyperlipidemia     We had a long discussion today about implications of untreated cholesterol.  Patient is tolerating atorvastatin 40 mg well.  Will increased to max dose of 80 mg and recheck cholesterol  in 2 months.    Discussed hyperlipidemia disease course.  Discussed the risk of cardiovascular disease, increase stroke and heart attack risk.  Patient voiced understanding and understood the treatment plan. All questions were answered.  Discussed healthy diet and increased need for exercise.           Relevant Medications    atorvastatin (LIPITOR) 80 MG tablet    aspirin (ECOTRIN) 81 MG EC tablet    Other Relevant Orders    Lipoprotein Analysis, by NMR    LIPOPROTEIN A (LPA)    Prediabetes     Patient has been compliant with metformin.  Will recheck level in 2 months.  Patient was tablets with eye doctor.  No issues with feet currently.  Pneumonia vaccine is due.    Monitor hemoglobin A1c.  Discussed diabetic diet and exercise protocol.  Continue medications.  Monitor for side effects.  Discussed checking blood glucose.  Discussed symptoms to monitor for and to notify me immediately if persistent or worsening.  Follow up with Ophthalmology/Optometry and Podiatry.           Hx of abnormal cervical Pap smear     Updating mammogram and Pap smear.  Referral to gyn.         Relevant Orders    Ambulatory referral to Gynecology    Carotid artery calcification     Patient started on aspirin this visit.  Patient also started on atorvastatin recently for cholesterol.  Patient is high risk for atherosclerosis.  Patient was previous smoker.         Relevant Orders    US Carotid Bilateral          Follow up in about 2 months (around 11/16/2019), or if symptoms worsen or fail to improve, for LAB RESULTS.    If no improvement in symptoms or symptoms worsen, advised to call/follow-up at clinic or go to ER. Patient voiced understanding and all questions/concerns were addressed.     DISCLAIMER: This note was compiled by using a speech recognition dictation system and therefore please be aware that typographical / speech recognition errors can and do occur.  Please contact me if you see any errors specifically.    Rick Vivas  MD  We Offer Telehealth & Same Day Appointments!   Book your Telehealth appointment with me through my nurse or   Clinic appointments on [facility]!    Office: [phone]          Check out my Facebook Page and Follow Me at: CLICK HERE    Check out my website at [facility] by clicking on: CLICK HERE    To Schedule appointments online, go to [facility]: CLICK HERE     Location: [email or web address]    [address] [place] [address]    FAX: [phone]

## 2019-09-16 NOTE — ASSESSMENT & PLAN NOTE
We had a long discussion today about implications of untreated cholesterol.  Patient is tolerating atorvastatin 40 mg well.  Will increased to max dose of 80 mg and recheck cholesterol in 2 months.    Discussed hyperlipidemia disease course.  Discussed the risk of cardiovascular disease, increase stroke and heart attack risk.  Patient voiced understanding and understood the treatment plan. All questions were answered.  Discussed healthy diet and increased need for exercise.

## 2019-09-19 ENCOUNTER — TELEPHONE (OUTPATIENT)
Dept: RADIOLOGY | Facility: HOSPITAL | Age: 59
End: 2019-09-19

## 2019-09-19 ENCOUNTER — TELEPHONE (OUTPATIENT)
Dept: ENDOSCOPY | Facility: HOSPITAL | Age: 59
End: 2019-09-19

## 2019-09-19 NOTE — TELEPHONE ENCOUNTER
Attempted to schedule endoscopy procedure. Left patient a message to call our office in regards to scheduling endoscopy procedure, call back number provided. My Chart Message Sent.

## 2019-09-19 NOTE — TELEPHONE ENCOUNTER
----- Message from Cortney Feng LPN sent at 9/19/2019 10:18 AM CDT -----  Regarding: FW: COLONOSCOPY   Can you please get this patient scheduled for her colonoscopy with Dr. Covington as she is calling stating that no one has tried to contact her.   Please.  Thank you  ROBERTO Ledbetter Dr.  ----- Message -----  From: Rick Vivas MD  Sent: 9/16/2019   1:15 PM  To: Cortney Feng LPN, Gissel Parks MA  Subject: COLONOSCOPY                                      This patient has not been contacted about setting up a colonoscopy.  I have referred her to Dr. COVINGTON.  Please find out what is going on with these referrals.    Make sure that they contact her and get her scheduled as soon as possible.

## 2019-09-20 NOTE — TELEPHONE ENCOUNTER
Called and left a message on patient's recorder that [facility] endoscopy department was attempting to and schedule her colonoscopy with her.

## 2019-09-23 ENCOUNTER — HOSPITAL ENCOUNTER (OUTPATIENT)
Dept: RADIOLOGY | Facility: HOSPITAL | Age: 59
Discharge: HOME | End: 2019-09-23
Attending: FAMILY MEDICINE
Payer: COMMERCIAL

## 2019-09-23 VITALS — WEIGHT: 172.63 LBS | BODY MASS INDEX: 30.59 KG/M2 | HEIGHT: 63 IN

## 2019-09-23 DIAGNOSIS — I65.29 CAROTID ARTERY CALCIFICATION, UNSPECIFIED LATERALITY: ICD-10-CM

## 2019-09-23 DIAGNOSIS — I65.22 STENOSIS OF LEFT CAROTID ARTERY: Primary | ICD-10-CM

## 2019-09-23 DIAGNOSIS — Z12.31 ENCOUNTER FOR SCREENING MAMMOGRAM FOR BREAST CANCER: ICD-10-CM

## 2019-09-23 PROCEDURE — 93880 EXTRACRANIAL BILAT STUDY: CPT | Mod: 26,,, | Performed by: RADIOLOGY

## 2019-09-23 PROCEDURE — 77067 MAMMO DIGITAL SCREENING BILAT WITH TOMOSYNTHESIS_CAD: ICD-10-PCS | Mod: 26,,, | Performed by: RADIOLOGY

## 2019-09-23 PROCEDURE — 93880 EXTRACRANIAL BILAT STUDY: CPT | Mod: TC,PO

## 2019-09-23 PROCEDURE — 77067 SCR MAMMO BI INCL CAD: CPT | Mod: TC,PO

## 2019-09-23 PROCEDURE — 77063 BREAST TOMOSYNTHESIS BI: CPT | Mod: 26,,, | Performed by: RADIOLOGY

## 2019-09-23 PROCEDURE — 77067 SCR MAMMO BI INCL CAD: CPT | Mod: 26,,, | Performed by: RADIOLOGY

## 2019-09-23 PROCEDURE — 77063 MAMMO DIGITAL SCREENING BILAT WITH TOMOSYNTHESIS_CAD: ICD-10-PCS | Mod: 26,,, | Performed by: RADIOLOGY

## 2019-09-23 PROCEDURE — 93880 US CAROTID BILATERAL: ICD-10-PCS | Mod: 26,,, | Performed by: RADIOLOGY

## 2019-09-23 NOTE — TELEPHONE ENCOUNTER
Letter mailed to patient's home address to call and set up her colonoscopy as we have been unable to reach her along with the endoscopy department.L

## 2019-09-23 NOTE — PROGRESS NOTES
Please call patient with abnormal ultrasound carotid results.  Referral to vascular surgery.    See orders.  Make sure the patient is taking aspirin daily

## 2019-09-25 ENCOUNTER — TELEPHONE (OUTPATIENT)
Dept: ENDOSCOPY | Facility: HOSPITAL | Age: 59
End: 2019-09-25

## 2019-09-25 ENCOUNTER — TELEPHONE (OUTPATIENT)
Dept: GASTROENTEROLOGY | Facility: CLINIC | Age: 59
End: 2019-09-25

## 2019-09-25 NOTE — TELEPHONE ENCOUNTER
----- Message from Norma Figueroa sent at 9/25/2019 10:48 AM CDT -----  Contact: pt [phone]  Patient called to make an appt to have her Colonoscopy. Please call back.

## 2019-09-25 NOTE — TELEPHONE ENCOUNTER
Patient called office to schedule procedure.  States she wanted procedure done in [place], patient received scheduling phone number.

## 2019-10-03 ENCOUNTER — TELEPHONE (OUTPATIENT)
Dept: RADIOLOGY | Facility: HOSPITAL | Age: 59
End: 2019-10-03

## 2019-10-04 ENCOUNTER — HOSPITAL ENCOUNTER (OUTPATIENT)
Dept: RADIOLOGY | Facility: HOSPITAL | Age: 59
Discharge: HOME | End: 2019-10-04
Attending: FAMILY MEDICINE
Payer: COMMERCIAL

## 2019-10-04 DIAGNOSIS — R92.8 ABNORMAL MAMMOGRAM: ICD-10-CM

## 2019-10-04 PROCEDURE — 77061 BREAST TOMOSYNTHESIS UNI: CPT | Mod: 26,,, | Performed by: RADIOLOGY

## 2019-10-04 PROCEDURE — 77065 MAMMO DIGITAL DIAGNOSTIC RIGHT WITH TOMOSYNTHESIS_CAD: ICD-10-PCS | Mod: 26,,, | Performed by: RADIOLOGY

## 2019-10-04 PROCEDURE — 77061 MAMMO DIGITAL DIAGNOSTIC RIGHT WITH TOMOSYNTHESIS_CAD: ICD-10-PCS | Mod: 26,,, | Performed by: RADIOLOGY

## 2019-10-04 PROCEDURE — 76642 ULTRASOUND BREAST LIMITED: CPT | Mod: 26,RT,, | Performed by: RADIOLOGY

## 2019-10-04 PROCEDURE — 76642 ULTRASOUND BREAST LIMITED: CPT | Mod: TC,PO,RT

## 2019-10-04 PROCEDURE — 77065 DX MAMMO INCL CAD UNI: CPT | Mod: 26,,, | Performed by: RADIOLOGY

## 2019-10-04 PROCEDURE — 77061 BREAST TOMOSYNTHESIS UNI: CPT | Mod: TC,PO

## 2019-10-04 PROCEDURE — 76642 US BREAST RIGHT LIMITED: ICD-10-PCS | Mod: 26,RT,, | Performed by: RADIOLOGY

## 2019-10-04 PROCEDURE — 77065 DX MAMMO INCL CAD UNI: CPT | Mod: TC,PO

## 2019-10-04 NOTE — PROGRESS NOTES
Your repeat diagnostic mammogram is Right: 2 - Benign  Overall: 2 - Benign.      Please repeat a mammogram in 1 year.      The patient's estimated lifetime risk of breast cancer (to age 84) based on Tyrer-Cuzick - 7 risk assessment model is: Tyrer-Cuzick: 6.22 %. According to the [employer] Society,  patients with a lifetime breast cancer risk of 20% or higher might benefit from supplemental screening tests..

## 2019-10-17 ENCOUNTER — OFFICE VISIT (OUTPATIENT)
Dept: CARDIAC SURGERY | Facility: CLINIC | Age: 59
End: 2019-10-17
Payer: COMMERCIAL

## 2019-10-17 VITALS
SYSTOLIC BLOOD PRESSURE: 144 MMHG | HEART RATE: 76 BPM | BODY MASS INDEX: 31.93 KG/M2 | WEIGHT: 173.5 LBS | HEIGHT: 62 IN | DIASTOLIC BLOOD PRESSURE: 90 MMHG

## 2019-10-17 DIAGNOSIS — I65.22 LEFT CAROTID ARTERY STENOSIS: Primary | ICD-10-CM

## 2019-10-17 PROCEDURE — 3008F PR BODY MASS INDEX (BMI) DOCUMENTED: ICD-10-PCS | Mod: CPTII,S$GLB,, | Performed by: THORACIC SURGERY (CARDIOTHORACIC VASCULAR SURGERY)

## 2019-10-17 PROCEDURE — 3008F BODY MASS INDEX DOCD: CPT | Mod: CPTII,S$GLB,, | Performed by: THORACIC SURGERY (CARDIOTHORACIC VASCULAR SURGERY)

## 2019-10-17 PROCEDURE — 99204 PR OFFICE/OUTPT VISIT, NEW, LEVL IV, 45-59 MIN: ICD-10-PCS | Mod: S$GLB,,, | Performed by: THORACIC SURGERY (CARDIOTHORACIC VASCULAR SURGERY)

## 2019-10-17 PROCEDURE — 99204 OFFICE O/P NEW MOD 45 MIN: CPT | Mod: S$GLB,,, | Performed by: THORACIC SURGERY (CARDIOTHORACIC VASCULAR SURGERY)

## 2019-10-17 PROCEDURE — 99999 PR PBB SHADOW E&M-EST. PATIENT-LVL III: ICD-10-PCS | Mod: PBBFAC,,, | Performed by: THORACIC SURGERY (CARDIOTHORACIC VASCULAR SURGERY)

## 2019-10-17 PROCEDURE — 99999 PR PBB SHADOW E&M-EST. PATIENT-LVL III: CPT | Mod: PBBFAC,,, | Performed by: THORACIC SURGERY (CARDIOTHORACIC VASCULAR SURGERY)

## 2019-10-17 NOTE — PROGRESS NOTES
This patient was found to have left carotid stenosis.  She was having a dental x-ray and was found to have calcific change in the carotid artery.  Carotid ultrasound was then done finding high-grade left carotid stenosis.  She had with sounds like a TIA involving the right upper extremity within last few months.  This has resolved.   Past history is pertinent for hypertension.  She is not a smoker.   She has a family history of heart disease.  She has no other major medical or surgical illnesses.  Her review of systems is fairly unremarkable.  She has had a previous hip replacement.  On physical exam vital signs are stable.  Her pupils are equal and round and reactive to light.   Her neck is supple.   Her chest is clear to auscultation.  Her heart is in a regular rate and rhythm.  Abdomen is benign.  Perfusion to the legs and feet seems be satisfactory.   The recent ultrasound is noted.  She has high-grade left carotid stenosis which may be symptomatic.  Recommendations for left carotid endarterectomy.  I explained this to the patient and  and they seem to be understanding and agreeable.  This will be arranged in the near future.

## 2019-10-18 DIAGNOSIS — I65.22 STENOSIS OF LEFT CAROTID ARTERY: Primary | ICD-10-CM

## 2019-10-18 DIAGNOSIS — I65.22 LEFT CAROTID STENOSIS: ICD-10-CM

## 2019-10-18 RX ORDER — LIDOCAINE HYDROCHLORIDE 10 MG/ML
1 INJECTION, SOLUTION EPIDURAL; INFILTRATION; INTRACAUDAL; PERINEURAL ONCE
Status: CANCELLED | OUTPATIENT
Start: 2019-10-18 | End: 2019-10-18

## 2019-10-18 RX ORDER — MUPIROCIN 20 MG/G
OINTMENT TOPICAL
Status: CANCELLED | OUTPATIENT
Start: 2019-10-18

## 2019-10-18 RX ORDER — CHLORHEXIDINE GLUCONATE ORAL RINSE 1.2 MG/ML
10 SOLUTION DENTAL
Status: CANCELLED | OUTPATIENT
Start: 2019-10-18

## 2019-10-28 ENCOUNTER — TELEPHONE (OUTPATIENT)
Dept: VASCULAR SURGERY | Facility: CLINIC | Age: 59
End: 2019-10-28

## 2019-10-28 NOTE — TELEPHONE ENCOUNTER
----- Message from Beatriz Glover sent at 10/28/2019 10:18 AM CDT -----  Contact: patient   Patient requesting a call back regarding upcoming procedure.Please call back at [phone].      Thanks,  Beatriz Glover

## 2019-10-29 PROBLEM — I65.22 LEFT CAROTID STENOSIS: Status: ACTIVE | Noted: 2019-10-29

## 2019-10-30 ENCOUNTER — TELEPHONE (OUTPATIENT)
Dept: VASCULAR SURGERY | Facility: CLINIC | Age: 59
End: 2019-10-30

## 2019-10-30 NOTE — TELEPHONE ENCOUNTER
----- Message from Madison Holcomb sent at 10/30/2019  1:24 PM CDT -----  Type:  Sooner Apoointment Request    Caller is requesting a sooner appointment.  Caller declined first available appointment listed below.  Caller will not accept being placed on the waitlist and is requesting a message be sent to doctor.    Name of Caller:  Violet with Case Management at [facility]  When is the first available appointment?  11/21/19  Symptoms:  2 wk post op  Best Call Back Number:  [phone] (home)   Additional Information:

## 2019-11-05 ENCOUNTER — TELEPHONE (OUTPATIENT)
Dept: VASCULAR SURGERY | Facility: CLINIC | Age: 59
End: 2019-11-05

## 2019-11-05 NOTE — TELEPHONE ENCOUNTER
Reassured pt some swelling and puffiness at incision to be expected at this stage of healing. Pt has po appt on 11/14 in [place]. To call if have more concerns before then.

## 2019-11-05 NOTE — TELEPHONE ENCOUNTER
----- Message from Sonja Dixon sent at 11/5/2019  8:44 AM CST -----  Type:  Needs Medical Advice    Who Called:  Pt  Jeannette  Symptoms (please be specific):   Had surgery   How long has patient had these symptoms:     Pharmacy name and phone #:     Would the patient rather a call back or a response via [facility]?   Call back  Best Call Back Number:    [phone]  Additional Information:   Pt states she had surgery and has questions//please call//adalberto/jose manuel

## 2019-11-14 ENCOUNTER — OFFICE VISIT (OUTPATIENT)
Dept: CARDIAC SURGERY | Facility: CLINIC | Age: 59
End: 2019-11-14
Payer: COMMERCIAL

## 2019-11-14 VITALS
BODY MASS INDEX: 28.51 KG/M2 | HEART RATE: 80 BPM | HEIGHT: 64 IN | DIASTOLIC BLOOD PRESSURE: 80 MMHG | WEIGHT: 167 LBS | SYSTOLIC BLOOD PRESSURE: 137 MMHG

## 2019-11-14 DIAGNOSIS — Z98.890 S/P CAROTID ENDARTERECTOMY: ICD-10-CM

## 2019-11-14 PROCEDURE — 99024 PR POST-OP FOLLOW-UP VISIT: ICD-10-PCS | Mod: S$GLB,,, | Performed by: THORACIC SURGERY (CARDIOTHORACIC VASCULAR SURGERY)

## 2019-11-14 PROCEDURE — 99024 POSTOP FOLLOW-UP VISIT: CPT | Mod: S$GLB,,, | Performed by: THORACIC SURGERY (CARDIOTHORACIC VASCULAR SURGERY)

## 2019-11-14 PROCEDURE — 99999 PR PBB SHADOW E&M-EST. PATIENT-LVL III: ICD-10-PCS | Mod: PBBFAC,,, | Performed by: THORACIC SURGERY (CARDIOTHORACIC VASCULAR SURGERY)

## 2019-11-14 PROCEDURE — 99999 PR PBB SHADOW E&M-EST. PATIENT-LVL III: CPT | Mod: PBBFAC,,, | Performed by: THORACIC SURGERY (CARDIOTHORACIC VASCULAR SURGERY)

## 2019-11-14 NOTE — PROGRESS NOTES
This patient is status post left carotid endarterectomy.  She comes back to the office today in follow-up.  She has done well.  On exam vital signs stable.  Her surgical wounds clean and dry.  Neurologically she remains intact.  At this point she has had a good result with carotid endarterectomy.  I would recommend a repeat carotid ultrasound in 3-4 months and then based on this further recommendations can be made.

## 2019-11-19 DIAGNOSIS — E03.9 HYPOTHYROIDISM, UNSPECIFIED TYPE: ICD-10-CM

## 2019-11-19 DIAGNOSIS — R73.03 PREDIABETES: ICD-10-CM

## 2019-11-19 RX ORDER — METFORMIN HYDROCHLORIDE 500 MG/1
500 TABLET ORAL
Qty: 30 TABLET | Refills: 3 | Status: SHIPPED | OUTPATIENT
Start: 2019-11-19 | End: 2019-12-19 | Stop reason: SDUPTHER

## 2019-11-19 RX ORDER — LEVOTHYROXINE SODIUM 25 UG/1
25 TABLET ORAL DAILY
Qty: 30 TABLET | Refills: 3 | Status: SHIPPED | OUTPATIENT
Start: 2019-11-19 | End: 2019-11-25

## 2019-11-21 ENCOUNTER — LAB VISIT (OUTPATIENT)
Dept: LAB | Facility: HOSPITAL | Age: 59
End: 2019-11-21
Attending: FAMILY MEDICINE
Payer: COMMERCIAL

## 2019-11-21 DIAGNOSIS — R73.03 PREDIABETES: ICD-10-CM

## 2019-11-21 DIAGNOSIS — E78.5 HYPERLIPIDEMIA, UNSPECIFIED HYPERLIPIDEMIA TYPE: ICD-10-CM

## 2019-11-21 DIAGNOSIS — D75.839 THROMBOCYTOSIS: ICD-10-CM

## 2019-11-21 DIAGNOSIS — E03.9 HYPOTHYROIDISM, UNSPECIFIED TYPE: ICD-10-CM

## 2019-11-21 PROBLEM — Z98.890 S/P CAROTID ENDARTERECTOMY: Status: ACTIVE | Noted: 2019-11-21

## 2019-11-21 LAB
BASOPHILS # BLD AUTO: 0.07 K/UL (ref 0–0.2)
BASOPHILS NFR BLD: 1 % (ref 0–1.9)
DIFFERENTIAL METHOD: ABNORMAL
EOSINOPHIL # BLD AUTO: 0.2 K/UL (ref 0–0.5)
EOSINOPHIL NFR BLD: 2.3 % (ref 0–8)
ERYTHROCYTE [DISTWIDTH] IN BLOOD BY AUTOMATED COUNT: 12.5 % (ref 11.5–14.5)
ESTIMATED AVG GLUCOSE: 123 MG/DL (ref 68–131)
HBA1C MFR BLD HPLC: 5.9 % (ref 4–5.6)
HCT VFR BLD AUTO: 42.7 % (ref 37–48.5)
HGB BLD-MCNC: 13.3 G/DL (ref 12–16)
IMM GRANULOCYTES # BLD AUTO: 0.02 K/UL (ref 0–0.04)
IMM GRANULOCYTES NFR BLD AUTO: 0.3 % (ref 0–0.5)
LYMPHOCYTES # BLD AUTO: 3.1 K/UL (ref 1–4.8)
LYMPHOCYTES NFR BLD: 42.5 % (ref 18–48)
MCH RBC QN AUTO: 30.5 PG (ref 27–31)
MCHC RBC AUTO-ENTMCNC: 31.1 G/DL (ref 32–36)
MCV RBC AUTO: 98 FL (ref 82–98)
MONOCYTES # BLD AUTO: 0.5 K/UL (ref 0.3–1)
MONOCYTES NFR BLD: 6.9 % (ref 4–15)
NEUTROPHILS # BLD AUTO: 3.4 K/UL (ref 1.8–7.7)
NEUTROPHILS NFR BLD: 47 % (ref 38–73)
NRBC BLD-RTO: 0 /100 WBC
PLATELET # BLD AUTO: 370 K/UL (ref 150–350)
PMV BLD AUTO: 9.7 FL (ref 9.2–12.9)
RBC # BLD AUTO: 4.36 M/UL (ref 4–5.4)
T4 FREE SERPL-MCNC: 0.93 NG/DL (ref 0.71–1.51)
TSH SERPL DL<=0.005 MIU/L-ACNC: 5.06 UIU/ML (ref 0.4–4)
WBC # BLD AUTO: 7.27 K/UL (ref 3.9–12.7)

## 2019-11-21 PROCEDURE — 83695 ASSAY OF LIPOPROTEIN(A): CPT

## 2019-11-21 PROCEDURE — 83036 HEMOGLOBIN GLYCOSYLATED A1C: CPT

## 2019-11-21 PROCEDURE — 85025 COMPLETE CBC W/AUTO DIFF WBC: CPT

## 2019-11-21 PROCEDURE — 36415 COLL VENOUS BLD VENIPUNCTURE: CPT | Mod: PO

## 2019-11-21 PROCEDURE — 84443 ASSAY THYROID STIM HORMONE: CPT

## 2019-11-21 PROCEDURE — 83704 LIPOPROTEIN BLD QUAN PART: CPT

## 2019-11-21 PROCEDURE — 84439 ASSAY OF FREE THYROXINE: CPT

## 2019-11-25 ENCOUNTER — OFFICE VISIT (OUTPATIENT)
Dept: FAMILY MEDICINE | Facility: CLINIC | Age: 59
End: 2019-11-25
Payer: COMMERCIAL

## 2019-11-25 VITALS
HEART RATE: 87 BPM | SYSTOLIC BLOOD PRESSURE: 136 MMHG | TEMPERATURE: 98 F | HEIGHT: 63 IN | DIASTOLIC BLOOD PRESSURE: 84 MMHG | BODY MASS INDEX: 29.52 KG/M2 | WEIGHT: 166.63 LBS

## 2019-11-25 DIAGNOSIS — E03.9 ACQUIRED HYPOTHYROIDISM: ICD-10-CM

## 2019-11-25 DIAGNOSIS — R73.03 PREDIABETES: ICD-10-CM

## 2019-11-25 DIAGNOSIS — Z79.899 ENCOUNTER FOR LONG-TERM (CURRENT) USE OF MEDICATIONS: ICD-10-CM

## 2019-11-25 DIAGNOSIS — E78.2 MIXED HYPERLIPIDEMIA: Primary | ICD-10-CM

## 2019-11-25 PROBLEM — Z00.01 ENCOUNTER FOR GENERAL ADULT MEDICAL EXAMINATION WITH ABNORMAL FINDINGS: Status: RESOLVED | Noted: 2019-08-20 | Resolved: 2019-11-25

## 2019-11-25 PROBLEM — Z13.6 ENCOUNTER FOR LIPID SCREENING FOR CARDIOVASCULAR DISEASE: Status: RESOLVED | Noted: 2019-08-20 | Resolved: 2019-11-25

## 2019-11-25 PROBLEM — E78.5 HYPERLIPIDEMIA: Chronic | Status: ACTIVE | Noted: 2019-09-16

## 2019-11-25 PROBLEM — Z13.220 ENCOUNTER FOR LIPID SCREENING FOR CARDIOVASCULAR DISEASE: Status: RESOLVED | Noted: 2019-08-20 | Resolved: 2019-11-25

## 2019-11-25 PROCEDURE — 99999 PR PBB SHADOW E&M-EST. PATIENT-LVL III: ICD-10-PCS | Mod: PBBFAC,,, | Performed by: FAMILY MEDICINE

## 2019-11-25 PROCEDURE — 99214 PR OFFICE/OUTPT VISIT, EST, LEVL IV, 30-39 MIN: ICD-10-PCS | Mod: S$GLB,,, | Performed by: FAMILY MEDICINE

## 2019-11-25 PROCEDURE — 99214 OFFICE O/P EST MOD 30 MIN: CPT | Mod: S$GLB,,, | Performed by: FAMILY MEDICINE

## 2019-11-25 PROCEDURE — 99999 PR PBB SHADOW E&M-EST. PATIENT-LVL III: CPT | Mod: PBBFAC,,, | Performed by: FAMILY MEDICINE

## 2019-11-25 RX ORDER — LEVOTHYROXINE SODIUM 50 UG/1
50 CAPSULE ORAL DAILY
Qty: 90 TABLET | Refills: 0 | Status: SHIPPED | OUTPATIENT
Start: 2019-11-25 | End: 2020-03-05 | Stop reason: SDUPTHER

## 2019-11-25 NOTE — PROGRESS NOTES
PLAN:      Problem List Items Addressed This Visit     Encounter for long-term (current) use of medications (Chronic)     Discussed labs with the patient.  See thyroid problem.    Previous plan:  Patient has multiple lab abnormalities which were addressed today.  Patient has new finding of thrombocytosis.  Patient's platelet count has been increasing over the last few readings.  Will continue to monitor.  Will refer to Hematology Oncology if needed.  Patient denies any issues with bleeding or bruising.    Patient also has hypothyroidism which is addressed today.  Patient also has uncontrolled hyperlipidemia which is being addressed.    Complete history and physical was completed today.  Complete and thorough medication reconciliation was performed.  Discussed risks and benefits of medications.  Advised patient on orders and health maintenance.  We discussed old records and old labs if available.  Will request any records not available through epic.  Continue current medications listed on your summary sheet.           Acquired hypothyroidism (Chronic)     Recheck level in two months.  Increase Synthroid to 50 mcg.Discussed hypothyroidism disease course.  Discussed risks and benefits of medication use.  ER precautions.           Relevant Medications    levothyroxine (TIROSINT) 50 mcg Cap    Other Relevant Orders    TSH    T3, free    T4, free    Hyperlipidemia - Primary (Chronic)     =======================================================  November 2019:  Special lipid panel is pending.  Will continue atorvastatin 80 mg for now.  Patient is status post left carotid endarterectomy.  Doing well.  ====================================================== We had a long discussion today about implications of untreated cholesterol.  Patient is tolerating atorvastatin 40 mg well.  Will increased to max dose of 80 mg and recheck cholesterol in 2 months.    Discussed hyperlipidemia disease course.  Discussed the risk of  cardiovascular disease, increase stroke and heart attack risk.  Patient voiced understanding and understood the treatment plan. All questions were answered.  Discussed healthy diet and increased need for exercise.           Relevant Orders    Lipid panel    Prediabetes (Chronic)     Patient has been compliant with metformin.  A1c is improved.  Recheck in three months.   No issues with feet currently.  Pneumonia vaccine is due.    Monitor hemoglobin A1c.  Discussed diabetic diet and exercise protocol.  Continue medications.  Monitor for side effects.  Discussed checking blood glucose.  Discussed symptoms to monitor for and to notify me immediately if persistent or worsening.  Follow up with Ophthalmology/Optometry and Podiatry.           Relevant Orders    Hemoglobin A1c        Future Appointments     Date Provider Specialty Appt Notes    2/25/2020  Lab     3/3/2020 Rick Vivas MD Family Medicine 3 mo f/u lipid video visit           Medication List with Changes/Refills   Current Medications    ASPIRIN (ECOTRIN) 81 MG EC TABLET    Take 1 tablet (81 mg total) by mouth once daily.    ATORVASTATIN (LIPITOR) 80 MG TABLET    Take 1 tablet (80 mg total) by mouth once daily.    METFORMIN (GLUCOPHAGE) 500 MG TABLET    Take 1 tablet (500 mg total) by mouth daily with breakfast.   Changed and/or Refilled Medications    Modified Medication Previous Medication    LEVOTHYROXINE (TIROSINT) 50 MCG CAP levothyroxine (SYNTHROID) 25 MCG tablet       Take 50 mcg by mouth once daily.    Take 1 tablet (25 mcg total) by mouth once daily.   Discontinued Medications    HYDROCODONE-ACETAMINOPHEN (NORCO) 5-325 MG PER TABLET    Take 1 tablet by mouth every 4 (four) hours as needed.       Rick Vivas M.D.     ==========================================================================  Subjective:      Patient ID: Jeannette Rahman is a 59 y.o. female.  has a past medical history of Anticoagulant long-term use, Arthritis, Colon polyp,  Pre-diabetes, and Thyroid disease.     Chief Complaint: Hyperlipidemia (3 mo f/u); Hypothyroidism; and Diabetes      Problem List Items Addressed This Visit     Encounter for long-term (current) use of medications (Chronic)    Overview     09/16/2019 Patient is on CHRONIC long-term drug therapy for managed conditions. See medication list. Reports compliance.  No side effects reported.  Routine lab work is being monitored.  Patient does  need refills today.   =======================================================  November 2019:    CHRONIC. Stable. Compliant with medications for managed conditions. See medication list. No SE reported.   Routine lab analysis is being monitored. Refills were addressed.  Lab Results   Component Value Date    WBC 7.27 11/21/2019    HGB 13.3 11/21/2019    HCT 42.7 11/21/2019    MCV 98 11/21/2019     (H) 11/21/2019       Chemistry        Component Value Date/Time     10/29/2019 2044    K 4.1 10/29/2019 2044     10/29/2019 2044    CO2 25 10/29/2019 2044    BUN 13 10/29/2019 2044    CREATININE 0.55 10/29/2019 2044     (H) 10/29/2019 2044        Component Value Date/Time    CALCIUM 8.9 10/29/2019 2044    ALKPHOS 86 08/21/2019 1021    AST 24 08/21/2019 1021    ALT 41 08/21/2019 1021    BILITOT 0.4 08/21/2019 1021    ESTGFRAFRICA >60 10/29/2019 2044    EGFRNONAA >60 10/29/2019 2044        Lab Results   Component Value Date    TSH 5.056 (H) 11/21/2019    K6OWIWQ 4.1 (L) 08/21/2019    FREET4 0.93 11/21/2019    T3FREE 2.6 08/21/2019     ======================================================         Current Assessment & Plan     Discussed labs with the patient.  See thyroid problem.    Previous plan:  Patient has multiple lab abnormalities which were addressed today.  Patient has new finding of thrombocytosis.  Patient's platelet count has been increasing over the last few readings.  Will continue to monitor.  Will refer to Hematology Oncology if needed.  Patient denies  any issues with bleeding or bruising.    Patient also has hypothyroidism which is addressed today.  Patient also has uncontrolled hyperlipidemia which is being addressed.    Complete history and physical was completed today.  Complete and thorough medication reconciliation was performed.  Discussed risks and benefits of medications.  Advised patient on orders and health maintenance.  We discussed old records and old labs if available.  Will request any records not available through epic.  Continue current medications listed on your summary sheet.           Acquired hypothyroidism (Chronic)    Overview     Thyroid ROS: fatigue, weight gain, feeling cold and cold intolerance and anxiousness.     Lab Results   Component Value Date    TSH 10.314 (H) 08/21/2019    S1RJYDG 4.1 (L) 08/21/2019    FREET4 0.74 08/21/2019     Exam: thyroid is normal in size without nodules or tenderness.   =======================================================  November 2019:    Lab Results   Component Value Date    TSH 5.056 (H) 11/21/2019    P8BMGKL 4.1 (L) 08/21/2019    FREET4 0.93 11/21/2019   Patient reports compliance with Synthroid 50 mcg.  Symptoms of fatigue still present.  ======================================================           Current Assessment & Plan     Recheck level in two months.  Increase Synthroid to 50 mcg.Discussed hypothyroidism disease course.  Discussed risks and benefits of medication use.  ER precautions.           Hyperlipidemia - Primary (Chronic)    Overview     Chronic.  Uncontrolled.  Patient not on statin previously.  Of note patient has had carotid calcification noted on recent imaging at dentist.  Checking bilateral carotid ultrasound for screening.  Patient has not established with a cardiologist.  Patient denies any chest pain shortness of breath blurry vision lightheaded dizziness etc.  Lab Results   Component Value Date    CHOL 329 (H) 08/21/2019    CHOL 221 (H) 08/08/2013    CHOL 313 (H)  05/06/2013     Lab Results   Component Value Date    HDL 44 08/21/2019    HDL 37 (L) 08/08/2013    HDL 64 05/06/2013     Lab Results   Component Value Date    LDLCALC 229.8 (H) 08/21/2019    LDLCALC 138.0 08/08/2013    LDLCALC 210.0 (H) 05/06/2013     Lab Results   Component Value Date    TRIG 276 (H) 08/21/2019    TRIG 230 (H) 08/08/2013    TRIG 197 (H) 05/06/2013     Lab Results   Component Value Date    CHOLHDL 13.4 (L) 08/21/2019    CHOLHDL 16.7 (L) 08/08/2013    CHOLHDL 20.4 05/06/2013              Current Assessment & Plan     =======================================================  November 2019:  Special lipid panel is pending.  Will continue atorvastatin 80 mg for now.  Patient is status post left carotid endarterectomy.  Doing well.  ====================================================== We had a long discussion today about implications of untreated cholesterol.  Patient is tolerating atorvastatin 40 mg well.  Will increased to max dose of 80 mg and recheck cholesterol in 2 months.    Discussed hyperlipidemia disease course.  Discussed the risk of cardiovascular disease, increase stroke and heart attack risk.  Patient voiced understanding and understood the treatment plan. All questions were answered.  Discussed healthy diet and increased need for exercise.           Prediabetes (Chronic)    Overview     09/16/2019   Chronic. Unstable.   Lab Results   Component Value Date    HGBA1C 5.9 (H) 11/21/2019   Patient taking Metformin daily. No side effects.   Med Compliance? Yes   =======================================================  November 2019:    Diabetes Management Status    Statin: Taking  ACE/ARB: Not taking    Screening or Prevention Patient's value Goal Complete/Controlled?   HgA1C Testing and Control   Lab Results   Component Value Date    HGBA1C 5.9 (H) 11/21/2019      Annually/Less than 8% Yes   Lipid profile : 08/21/2019 Annually Yes   LDL control Lab Results   Component Value Date    LDLCALC  229.8 (H) 08/21/2019    Annually/Less than 100 mg/dl  No   Nephropathy screening No results found for: LABMICR  Lab Results   Component Value Date    PROTEINUA Negative 08/20/2019    Annually Yes   Blood pressure BP Readings from Last 1 Encounters:   11/25/19 136/84    Less than 140/90 Yes   Dilated retinal exam Most Recent Eye Exam Date: Not Found Annually Yes   Foot exam   Most Recent Foot Exam Date: Not Found Annually Yes       ======================================================         Current Assessment & Plan     Patient has been compliant with metformin.  A1c is improved.  Recheck in three months.   No issues with feet currently.  Pneumonia vaccine is due.    Monitor hemoglobin A1c.  Discussed diabetic diet and exercise protocol.  Continue medications.  Monitor for side effects.  Discussed checking blood glucose.  Discussed symptoms to monitor for and to notify me immediately if persistent or worsening.  Follow up with Ophthalmology/Optometry and Podiatry.                  Past Medical History:  Past Medical History:   Diagnosis Date    Anticoagulant long-term use     Arthritis     Colon polyp     Pre-diabetes     Thyroid disease      Past Surgical History:   Procedure Laterality Date    bilateral carpal tunnel release      CAROTID ENDARTERECTOMY Left 10/29/2019    Procedure: ENDARTERECTOMY-CAROTID;  Surgeon: Bhanu Read MD;  Location: [facility];  Service: Cardiovascular;  Laterality: Left;    COLONOSCOPY  12/17/2013         HIP SURGERY      JOINT REPLACEMENT       Review of patient's allergies indicates:   Allergen Reactions    Sulfa (sulfonamide antibiotics)      Medication List with Changes/Refills   Current Medications    ASPIRIN (ECOTRIN) 81 MG EC TABLET    Take 1 tablet (81 mg total) by mouth once daily.    ATORVASTATIN (LIPITOR) 80 MG TABLET    Take 1 tablet (80 mg total) by mouth once daily.    METFORMIN (GLUCOPHAGE) 500 MG TABLET    Take 1 tablet (500 mg total) by mouth daily  with breakfast.   Changed and/or Refilled Medications    Modified Medication Previous Medication    LEVOTHYROXINE (TIROSINT) 50 MCG CAP levothyroxine (SYNTHROID) 25 MCG tablet       Take 50 mcg by mouth once daily.    Take 1 tablet (25 mcg total) by mouth once daily.   Discontinued Medications    HYDROCODONE-ACETAMINOPHEN (NORCO) 5-325 MG PER TABLET    Take 1 tablet by mouth every 4 (four) hours as needed.      Social History     Tobacco Use    Smoking status: Former Smoker     Packs/day: 0.00     Years: 2.00     Pack years: 0.00     Types: Cigarettes     Last attempt to quit:      Years since quittin.9    Smokeless tobacco: Never Used   Substance Use Topics    Alcohol use: Yes     Comment: socially      Family History   Problem Relation Age of Onset    Alzheimer's disease Mother     Arthritis Mother     Heart disease Father     Diabetes Father     Cancer Maternal Grandfather     Anesthesia problems Neg Hx     Clotting disorder Neg Hx        I have reviewed the complete PMH, social history, surgical history, allergies and medications.  As well as family history.    Review of Systems   Constitutional: Negative for activity change and unexpected weight change.   HENT: Negative for hearing loss, rhinorrhea and trouble swallowing.    Eyes: Negative for discharge and visual disturbance.   Respiratory: Negative for chest tightness and wheezing.    Cardiovascular: Negative for chest pain and palpitations.   Gastrointestinal: Negative for blood in stool, constipation, diarrhea and vomiting.   Endocrine: Negative for polydipsia and polyuria.   Genitourinary: Negative for difficulty urinating, dysuria, hematuria and menstrual problem.   Musculoskeletal: Negative for arthralgias, joint swelling and neck pain.   Neurological: Negative for weakness and headaches.   Psychiatric/Behavioral: Negative for confusion and dysphoric mood.     Objective:   /84   Pulse 87   Temp 98.3 °F (36.8 °C) (Oral)   Ht 5'  "3" (1.6 m)   Wt 75.6 kg (166 lb 9.6 oz)   BMI 29.51 kg/m²   Physical Exam   Constitutional: She is oriented to person, place, and time. She appears well-developed and well-nourished. No distress.   HENT:   Head: Normocephalic and atraumatic.   Eyes: Pupils are equal, round, and reactive to light. EOM are normal.   Neck: Normal range of motion. Neck supple. No thyroid mass and no thyromegaly present.       Cardiovascular: Normal rate, regular rhythm, normal heart sounds and intact distal pulses.   No murmur heard.  Pulmonary/Chest: Effort normal and breath sounds normal. No respiratory distress. She has no wheezes.   Abdominal: Soft. Bowel sounds are normal. She exhibits no distension.   Musculoskeletal: Normal range of motion. She exhibits no edema.        Right foot: There is normal range of motion and no deformity.        Left foot: There is normal range of motion and no deformity.   Feet:   Right Foot:   Skin Integrity: Negative for ulcer, blister, skin breakdown, erythema, warmth, callus or dry skin.   Left Foot:   Skin Integrity: Negative for ulcer, blister, skin breakdown, erythema, warmth, callus or dry skin.   Neurological: She is alert and oriented to person, place, and time. No cranial nerve deficit.   Skin: Skin is warm and dry. Capillary refill takes less than 2 seconds.   Psychiatric: She has a normal mood and affect. Her behavior is normal.   Nursing note and vitals reviewed.      Assessment:     1. Mixed hyperlipidemia    2. Acquired hypothyroidism    3. Prediabetes    4. Encounter for long-term (current) use of medications      MDM:     I have Reviewed and summarized old records.  I have performed thorough medication reconciliation today and discussed risk and benefits of each medication.  I have reviewed labs and discussed with patient.  All questions were answered.  I am requesting old records and will review them once they are available.    I have signed for the following orders AND/OR " meds.  Orders Placed This Encounter   Procedures    Lipid panel     Standing Status:   Future     Standing Expiration Date:   1/23/2021    Hemoglobin A1c     Standing Status:   Future     Standing Expiration Date:   1/23/2021    TSH     Standing Status:   Future     Standing Expiration Date:   1/23/2021    T3, free     Standing Status:   Future     Standing Expiration Date:   1/23/2021    T4, free     Standing Status:   Future     Standing Expiration Date:   1/23/2021     Medications Ordered This Encounter   Medications    levothyroxine (TIROSINT) 50 mcg Cap     Sig: Take 50 mcg by mouth once daily.     Dispense:  90 tablet     Refill:  0        Follow up in about 3 months (around 2/25/2020), or if symptoms worsen or fail to improve, for Med refills, LAB RESULTS, HLD.    If no improvement in symptoms or symptoms worsen, advised to call/follow-up at clinic or go to ER. Patient voiced understanding and all questions/concerns were addressed.     DISCLAIMER: This note was compiled by using a speech recognition dictation system and therefore please be aware that typographical / speech recognition errors can and do occur.  Please contact me if you see any errors specifically.    Rick Vivas M.D.       Office: [phone]   [address] [place] [address]  FAX: [phone]

## 2019-11-25 NOTE — ASSESSMENT & PLAN NOTE
=======================================================  November 2019:  Special lipid panel is pending.  Will continue atorvastatin 80 mg for now.  Patient is status post left carotid endarterectomy.  Doing well.  ====================================================== We had a long discussion today about implications of untreated cholesterol.  Patient is tolerating atorvastatin 40 mg well.  Will increased to max dose of 80 mg and recheck cholesterol in 2 months.    Discussed hyperlipidemia disease course.  Discussed the risk of cardiovascular disease, increase stroke and heart attack risk.  Patient voiced understanding and understood the treatment plan. All questions were answered.  Discussed healthy diet and increased need for exercise.

## 2019-11-25 NOTE — PATIENT INSTRUCTIONS
Follow up in about 3 months (around 2/25/2020), or if symptoms worsen or fail to improve, for Med refills, LAB RESULTS, HLD.     If no improvement in symptoms or symptoms worsen, please be advised to call MD, follow-up at clinic and/or go to ER if becomes severe.    Rick Vivas M.D.         We Offer TELEHEATLH & Same Day Appointments!   Book your Telehealth appointment with me through my nurse or   Clinic appointments on [employer]!    [address] [place] [address]    Office: [phone]     FAX: [phone]    Check out my Facebook Page and Follow Me at: CLICK HERE    Check out my website at [employer] by clicking on: CLICK HERE    To Schedule appointments online, go to [employer]: CLICK HERE     Location: [email or web address]

## 2019-11-26 ENCOUNTER — TELEPHONE (OUTPATIENT)
Dept: FAMILY MEDICINE | Facility: CLINIC | Age: 59
End: 2019-11-26

## 2019-11-26 LAB
CHOLEST SERPL-MCNC: 168 MG/DL
HDL SERPL QN: 8.7 NM
HDL SERPL-SCNC: 36.2 UMOL/L
HDLC SERPL-MCNC: 55 MG/DL (ref 40–59)
HLD.LARGE SERPL-SCNC: 4 UMOL/L
LDL SERPL QN: 20.8 NM
LDL SERPL-SCNC: 1155 NMOL/L
LDL SMALL SERPL-SCNC: 557 NMOL/L
LDLC SERPL CALC-MCNC: 77 MG/DL
LPA SERPL-MCNC: 39 MG/DL (ref 0–30)
PATHOLOGY STUDY: ABNORMAL
TRIGL SERPL-MCNC: 182 MG/DL (ref 30–149)
VLDL LARGE SERPL-SCNC: 2.2 NMOL/L
VLDL SERPL QN: 46.3 NM

## 2019-11-26 NOTE — ASSESSMENT & PLAN NOTE
Recheck level in two months.  Increase Synthroid to 50 mcg.Discussed hypothyroidism disease course.  Discussed risks and benefits of medication use.  ER precautions.

## 2019-11-26 NOTE — TELEPHONE ENCOUNTER
Called and spoke with patient, patient notified of results and recommendations per Dr. Vivas, patient verbalized understanding of this and states that Dr. Vivas had already discussed her medication with her and she verbalized understanding of this.

## 2019-11-26 NOTE — TELEPHONE ENCOUNTER
----- Message from Rodriguez Lara sent at 11/26/2019 10:38 AM CST -----  Contact: PT   Type:  Test Results    Who Called: Pt   Name of Test (Lab/Mammo/Etc): labs  Date of Test: 11/25/19  Ordering Provider: eamon  Where the test was performed: [facility]  Would the patient rather a call back or a response via [facility]? Call back   Best Call Back Number: [phone] (home)   Additional Information:  The patient is requesting to speak with Nurse Barr,please advise.

## 2019-11-26 NOTE — ASSESSMENT & PLAN NOTE
Discussed labs with the patient.  See thyroid problem.    Previous plan:  Patient has multiple lab abnormalities which were addressed today.  Patient has new finding of thrombocytosis.  Patient's platelet count has been increasing over the last few readings.  Will continue to monitor.  Will refer to Hematology Oncology if needed.  Patient denies any issues with bleeding or bruising.    Patient also has hypothyroidism which is addressed today.  Patient also has uncontrolled hyperlipidemia which is being addressed.    Complete history and physical was completed today.  Complete and thorough medication reconciliation was performed.  Discussed risks and benefits of medications.  Advised patient on orders and health maintenance.  We discussed old records and old labs if available.  Will request any records not available through epic.  Continue current medications listed on your summary sheet.

## 2019-11-27 NOTE — PROGRESS NOTES
CALL THE PATIENT to discuss results and document verification.    I have sent a message to them with the interpretation (see below).  See if they have any questions and make a follow-up appointment if not already schedule and if needed.    Also please address any outstanding health maintenance that may be due: TETANUS VACCINE due on 05/30/1977  Pap Smear with HPV Cotest due on 05/30/1980  Colonoscopy due on 12/17/2018  ====================================    My interpretation that was sent to them through [employer]:    I have reviewed your recent blood work.   YOUR CHOLESTEROL HAS IMPROVED DRAMATICALLY SINCE AUGUST.  CONGRATULATIONS THE MEDICATION IS WORKING VERY WELL AND ALL OF YOUR NUMBERS ARE STABLE AND WITHIN NORMAL LIMITS.  I WOULD CONTINUE THIS MEDICATION REGIMEN ALONG WITH LIFESTYLE MODIFICATION INCLUDING DIET AND EXERCISE.     Plan is to repeat cholesterol in 6 months along with hepatic function test.

## 2019-12-13 ENCOUNTER — TELEPHONE (OUTPATIENT)
Dept: ENDOSCOPY | Facility: HOSPITAL | Age: 59
End: 2019-12-13

## 2019-12-19 ENCOUNTER — PATIENT OUTREACH (OUTPATIENT)
Dept: ADMINISTRATIVE | Facility: HOSPITAL | Age: 59
End: 2019-12-19

## 2019-12-19 DIAGNOSIS — R73.03 PREDIABETES: ICD-10-CM

## 2019-12-19 RX ORDER — METFORMIN HYDROCHLORIDE 500 MG/1
500 TABLET ORAL
Qty: 30 TABLET | Refills: 3 | Status: SHIPPED | OUTPATIENT
Start: 2019-12-19 | End: 2020-03-05 | Stop reason: SDUPTHER

## 2019-12-19 NOTE — TELEPHONE ENCOUNTER
----- Message from Reinier Mccain sent at 12/19/2019 11:14 AM CST -----  ..Type:  RX Refill Request    Who Called:  Pt   Refill or New Rx: refill   RX Name and Strength:metformin  How is the patient currently taking it? (ex. 1XDay):  Is this a 30 day or 90 day RX:30  Preferred Pharmacy with phone number:..  [facility] [place] - [address] [address] [address] [address]  [place] [address]  Phone: [phone] Fax: [phone]    Local or Mail Order:local   Ordering Provider: Dr Vivas   Would the patient rather a call back or a response via [facility]? Call back   Best Call Back Number:[phone]  Additional Information:

## 2020-02-25 ENCOUNTER — LAB VISIT (OUTPATIENT)
Dept: LAB | Facility: HOSPITAL | Age: 60
End: 2020-02-25
Attending: FAMILY MEDICINE
Payer: COMMERCIAL

## 2020-02-25 DIAGNOSIS — R73.03 PREDIABETES: ICD-10-CM

## 2020-02-25 DIAGNOSIS — E03.9 ACQUIRED HYPOTHYROIDISM: ICD-10-CM

## 2020-02-25 DIAGNOSIS — E78.2 MIXED HYPERLIPIDEMIA: ICD-10-CM

## 2020-02-25 LAB
CHOLEST SERPL-MCNC: 182 MG/DL (ref 120–199)
CHOLEST/HDLC SERPL: 3.1 {RATIO} (ref 2–5)
ESTIMATED AVG GLUCOSE: 131 MG/DL (ref 68–131)
HBA1C MFR BLD HPLC: 6.2 % (ref 4–5.6)
HDLC SERPL-MCNC: 58 MG/DL (ref 40–75)
HDLC SERPL: 31.9 % (ref 20–50)
LDLC SERPL CALC-MCNC: 80 MG/DL (ref 63–159)
NONHDLC SERPL-MCNC: 124 MG/DL
T3FREE SERPL-MCNC: 2.8 PG/ML (ref 2.3–4.2)
T4 FREE SERPL-MCNC: 0.98 NG/DL (ref 0.71–1.51)
TRIGL SERPL-MCNC: 220 MG/DL (ref 30–150)
TSH SERPL DL<=0.005 MIU/L-ACNC: 3.92 UIU/ML (ref 0.4–4)

## 2020-02-25 PROCEDURE — 84439 ASSAY OF FREE THYROXINE: CPT

## 2020-02-25 PROCEDURE — 36415 COLL VENOUS BLD VENIPUNCTURE: CPT | Mod: PO

## 2020-02-25 PROCEDURE — 84481 FREE ASSAY (FT-3): CPT

## 2020-02-25 PROCEDURE — 83036 HEMOGLOBIN GLYCOSYLATED A1C: CPT

## 2020-02-25 PROCEDURE — 80061 LIPID PANEL: CPT

## 2020-02-25 PROCEDURE — 84443 ASSAY THYROID STIM HORMONE: CPT

## 2020-02-26 ENCOUNTER — PATIENT MESSAGE (OUTPATIENT)
Dept: FAMILY MEDICINE | Facility: CLINIC | Age: 60
End: 2020-02-26

## 2020-02-26 NOTE — PROGRESS NOTES
#CALL THE PATIENT# to discuss results/see if they have questions and document verification. Make FU appt if needed.    #Pend me the orders in my interpretation below.#    I have sent a message to them with the interpretation (see below).  See if they have any questions and make a follow-up appointment if not already schedule and if needed.    Also please address any outstanding health maintenance that may be due: TETANUS VACCINE due on 05/30/1977  Pap Smear with HPV Cotest due on 05/30/1980  Colonoscopy due on 12/17/2018  ====================================    My interpretation that was sent to them through "[employer]:    Ms. Rahman, I have reviewed your recent blood work.     Thyroid studies are normal.  Continue current medication regimen.  Recheck in six months  Your cholesterol is drastically improved total cholesterol and LDL.  Continue current medication regimen.  Triglycerides are still elevated will discuss further at follow-up office visit  Your hemoglobin A1c is 6.2 which is higher than before but still in prediabetic range.  Continue metformin.  Recheck A1c in six months..  This test is gold standard screening test for diabetes.  It is a measures 3 months of your average blood sugar.    Plan is to recheck labs including thyroid cholesterol and A1c in six months.

## 2020-03-02 DIAGNOSIS — E03.9 ACQUIRED HYPOTHYROIDISM: Primary | ICD-10-CM

## 2020-03-05 DIAGNOSIS — E78.5 HYPERLIPIDEMIA, UNSPECIFIED HYPERLIPIDEMIA TYPE: ICD-10-CM

## 2020-03-05 DIAGNOSIS — R73.03 PREDIABETES: ICD-10-CM

## 2020-03-05 DIAGNOSIS — E03.9 ACQUIRED HYPOTHYROIDISM: ICD-10-CM

## 2020-03-06 RX ORDER — METFORMIN HYDROCHLORIDE 500 MG/1
500 TABLET ORAL
Qty: 90 TABLET | Refills: 3 | Status: SHIPPED | OUTPATIENT
Start: 2020-03-06 | End: 2021-03-01

## 2020-03-06 RX ORDER — LEVOTHYROXINE SODIUM 50 UG/1
50 CAPSULE ORAL DAILY
Qty: 90 CAPSULE | Refills: 3 | Status: SHIPPED | OUTPATIENT
Start: 2020-03-06 | End: 2020-11-10

## 2020-03-06 RX ORDER — ATORVASTATIN CALCIUM 80 MG/1
80 TABLET, FILM COATED ORAL DAILY
Qty: 90 TABLET | Refills: 3 | Status: SHIPPED | OUTPATIENT
Start: 2020-03-06 | End: 2021-02-12

## 2020-04-23 DIAGNOSIS — I65.23 BILATERAL CAROTID ARTERY STENOSIS: Primary | ICD-10-CM

## 2020-05-13 ENCOUNTER — TELEPHONE (OUTPATIENT)
Dept: RADIOLOGY | Facility: HOSPITAL | Age: 60
End: 2020-05-13

## 2020-05-14 ENCOUNTER — HOSPITAL ENCOUNTER (OUTPATIENT)
Dept: RADIOLOGY | Facility: HOSPITAL | Age: 60
Discharge: HOME | End: 2020-05-14
Attending: THORACIC SURGERY (CARDIOTHORACIC VASCULAR SURGERY)
Payer: COMMERCIAL

## 2020-05-14 DIAGNOSIS — I65.23 BILATERAL CAROTID ARTERY STENOSIS: ICD-10-CM

## 2020-05-14 PROCEDURE — 93880 US CAROTID BILATERAL: ICD-10-PCS | Mod: 26,,, | Performed by: RADIOLOGY

## 2020-05-14 PROCEDURE — 93880 EXTRACRANIAL BILAT STUDY: CPT | Mod: 26,,, | Performed by: RADIOLOGY

## 2020-05-14 PROCEDURE — 93880 EXTRACRANIAL BILAT STUDY: CPT | Mod: TC,PO

## 2020-08-06 ENCOUNTER — LAB VISIT (OUTPATIENT)
Dept: LAB | Facility: HOSPITAL | Age: 60
End: 2020-08-06
Attending: FAMILY MEDICINE
Payer: COMMERCIAL

## 2020-08-06 DIAGNOSIS — R73.03 PREDIABETES: ICD-10-CM

## 2020-08-06 DIAGNOSIS — E03.9 ACQUIRED HYPOTHYROIDISM: ICD-10-CM

## 2020-08-06 DIAGNOSIS — E78.5 HYPERLIPIDEMIA, UNSPECIFIED HYPERLIPIDEMIA TYPE: ICD-10-CM

## 2020-08-06 LAB
CHOLEST SERPL-MCNC: 190 MG/DL (ref 120–199)
CHOLEST/HDLC SERPL: 3.7 {RATIO} (ref 2–5)
ESTIMATED AVG GLUCOSE: 146 MG/DL (ref 68–131)
HBA1C MFR BLD HPLC: 6.7 % (ref 4–5.6)
HDLC SERPL-MCNC: 51 MG/DL (ref 40–75)
HDLC SERPL: 26.8 % (ref 20–50)
LDLC SERPL CALC-MCNC: 104.2 MG/DL (ref 63–159)
NONHDLC SERPL-MCNC: 139 MG/DL
T4 FREE SERPL-MCNC: 0.9 NG/DL (ref 0.71–1.51)
TRIGL SERPL-MCNC: 174 MG/DL (ref 30–150)
TSH SERPL DL<=0.005 MIU/L-ACNC: 5.48 UIU/ML (ref 0.4–4)

## 2020-08-06 PROCEDURE — 36415 COLL VENOUS BLD VENIPUNCTURE: CPT | Mod: PO

## 2020-08-06 PROCEDURE — 80061 LIPID PANEL: CPT

## 2020-08-06 PROCEDURE — 84439 ASSAY OF FREE THYROXINE: CPT

## 2020-08-06 PROCEDURE — 83036 HEMOGLOBIN GLYCOSYLATED A1C: CPT

## 2020-08-06 PROCEDURE — 84443 ASSAY THYROID STIM HORMONE: CPT

## 2020-08-07 NOTE — PROGRESS NOTES
#CALL THE PATIENT# to discuss results/see if they have questions and document verification. Make FU appt if needed.    #Pend me the orders in my interpretation below.#    I have sent a message to them with the interpretation (see below).  See if they have any questions and make a follow-up appointment if not already schedule and if needed.    Also please address any outstanding health maintenance that may be due: HIV Screening due on 05/30/1974  TETANUS VACCINE due on 05/30/1977  Cervical Cancer Screening due on 05/30/1980  Shingles Vaccine(1 of 2) due on 05/30/2009  Colorectal Cancer Screening due on 12/17/2018  ====================================    My interpretation that was sent to them through [employer]:    Jeannette, I have reviewed your recent blood work.     Thyroid studies are abnormal.  Please follow-up if you are having any symptoms has a medication change may be needed.  Your cholesterol is stable.  Continue current medication regimen.  Your hemoglobin A1c is increased from previous now 6.7 up from 6.2.  Increase metformin to 500 mg twice a day.  This test is gold standard screening test for diabetes.  It is a measures 3 months of your average blood sugar.    Please make a follow-up appointment so we can discuss these lab abnormalities.

## 2020-08-12 ENCOUNTER — TELEPHONE (OUTPATIENT)
Dept: FAMILY MEDICINE | Facility: CLINIC | Age: 60
End: 2020-08-12

## 2020-08-12 NOTE — TELEPHONE ENCOUNTER
----- Message from Carito Galindo sent at 8/12/2020 12:55 PM CDT -----  Type:  Patient Returning Call    Who Called:Jeannette Rahman  Who Left Message for Patient: Gissel   Does the patient know what this is regarding?: Lab Results   Would the patient rather a call back or a response via [facility]? Call back   Best Call Back Number:[phone] (cell)   Additional Information:  Patient stated that she is returning Nurse Gissel's phone call

## 2020-08-13 ENCOUNTER — TELEPHONE (OUTPATIENT)
Dept: FAMILY MEDICINE | Facility: CLINIC | Age: 60
End: 2020-08-13

## 2020-08-13 DIAGNOSIS — R73.03 PREDIABETES: Primary | Chronic | ICD-10-CM

## 2020-08-13 DIAGNOSIS — E11.65 TYPE 2 DIABETES MELLITUS WITH HYPERGLYCEMIA, WITHOUT LONG-TERM CURRENT USE OF INSULIN: ICD-10-CM

## 2020-08-13 RX ORDER — EMPAGLIFLOZIN 10 MG/1
10 TABLET, FILM COATED ORAL DAILY
Qty: 90 TABLET | Refills: 1 | Status: SHIPPED | OUTPATIENT
Start: 2020-08-13 | End: 2020-11-11 | Stop reason: SDUPTHER

## 2020-08-13 NOTE — TELEPHONE ENCOUNTER
Called and notified patient of medication change per Dr. Vivas, medication list up dated along with allergies updated.  Patient verbalized understanding of this.

## 2020-08-13 NOTE — TELEPHONE ENCOUNTER
Returned call to patient, patient states that the increase in metformin she can't handle the heat outside and gets nauseated.  Patient states that she would like to change to something totally different than the metformin.

## 2020-08-13 NOTE — TELEPHONE ENCOUNTER
----- Message from Reinier Mccain sent at 8/13/2020  9:48 AM CDT -----  ..Type:  Patient Returning Call    Who Called: pt   Who Left Message for Patient:  Does the patient know what this is regarding?:  meds   Would the patient rather a call back or a response via [email or web address]?  Call back   Best Call Back Number [phone]  Additional Information: Pt is requesting a call from nurse to discuss she want to make a change from metformin

## 2020-08-13 NOTE — TELEPHONE ENCOUNTER
Add metformin to allergies as intolerant.  Sending alternative medication.  Please notify patient.

## 2020-10-06 ENCOUNTER — PATIENT MESSAGE (OUTPATIENT)
Dept: ADMINISTRATIVE | Facility: HOSPITAL | Age: 60
End: 2020-10-06

## 2020-11-06 ENCOUNTER — LAB VISIT (OUTPATIENT)
Dept: LAB | Facility: HOSPITAL | Age: 60
End: 2020-11-06
Attending: FAMILY MEDICINE
Payer: COMMERCIAL

## 2020-11-06 DIAGNOSIS — R73.03 PREDIABETES: ICD-10-CM

## 2020-11-06 LAB
ESTIMATED AVG GLUCOSE: 140 MG/DL (ref 68–131)
HBA1C MFR BLD HPLC: 6.5 % (ref 4–5.6)

## 2020-11-06 PROCEDURE — 83036 HEMOGLOBIN GLYCOSYLATED A1C: CPT

## 2020-11-06 PROCEDURE — 36415 COLL VENOUS BLD VENIPUNCTURE: CPT | Mod: PO

## 2020-11-07 NOTE — PROGRESS NOTES
Please CALL patient with results and Document verification.   [phone]  A1C IS STABLE.  KEEP UP THE GREAT WORK!  REPEAT A1C EVERY SIX MONTHS.

## 2020-11-10 ENCOUNTER — TELEPHONE (OUTPATIENT)
Dept: FAMILY MEDICINE | Facility: CLINIC | Age: 60
End: 2020-11-10

## 2020-11-10 ENCOUNTER — PATIENT MESSAGE (OUTPATIENT)
Dept: FAMILY MEDICINE | Facility: CLINIC | Age: 60
End: 2020-11-10

## 2020-11-10 DIAGNOSIS — E03.9 ACQUIRED HYPOTHYROIDISM: ICD-10-CM

## 2020-11-10 DIAGNOSIS — E03.9 ACQUIRED HYPOTHYROIDISM: Primary | ICD-10-CM

## 2020-11-10 RX ORDER — LEVOTHYROXINE SODIUM 50 UG/1
50 CAPSULE ORAL
Qty: 90 CAPSULE | Refills: 1 | Status: SHIPPED | OUTPATIENT
Start: 2020-11-10 | End: 2020-11-10

## 2020-11-10 RX ORDER — LIOTHYRONINE SODIUM 25 UG/1
25 TABLET ORAL DAILY
Qty: 30 TABLET | Refills: 1 | Status: SHIPPED | OUTPATIENT
Start: 2020-11-10 | End: 2020-12-15 | Stop reason: SDUPTHER

## 2020-11-10 NOTE — TELEPHONE ENCOUNTER
I received your message which was reviewed along with the the medication list and allergies that we have below.  Please review it for accuracy to make sure that we have the most recent records on your history.     Based on this, the following orders were placed AND/OR medicines were sent in.     Orders Placed This Encounter   Procedures    TSH     Standing Status:   Future     Standing Expiration Date:   1/9/2022    T3, Free     Standing Status:   Future     Standing Expiration Date:   1/9/2022    T4, Free     Standing Status:   Future     Standing Expiration Date:   1/9/2022       Medications written and sent at this time include:  Medications Ordered This Encounter   Medications    liothyronine (CYTOMEL) 25 MCG Tab     Sig: Take 1 tablet (25 mcg total) by mouth once daily.     Dispense:  30 tablet     Refill:  1       Your pharmacy(ies) of choice at this time on record include the list below and any medications would have been sent to the one at the top.    [facility] - [facility] [address] [facility] [address]  [address] [address] [address]  Phone: [phone] Fax: [phone]    [facility] - [address]  [address]  [facility] [address]  Phone: [phone] Fax: [phone]      Thank you for choosing us as your healthcare provider!  Dr. Rick Vivas    ALLERGY LIST  Review of patient's allergies indicates:   Allergen Reactions    Metformin Nausea Only    Sulfa (sulfonamide antibiotics)        MEDICATION LIST  Current Outpatient Medications on File Prior to Visit   Medication Sig Dispense Refill    aspirin (ECOTRIN) 81 MG EC tablet Take 1 tablet (81 mg total) by mouth once daily. (Patient taking differently: Take 81 mg by mouth every evening. PER MD INSTRUCTIONS) 90 tablet 3    atorvastatin (LIPITOR) 80 MG tablet Take 1 tablet (80 mg total) by mouth once daily. 90 tablet 3    empagliflozin (JARDIANCE) 10 mg tablet Take 1 tablet  (10 mg total) by mouth once daily. 90 tablet 1    metFORMIN (GLUCOPHAGE) 500 MG tablet Take 1 tablet (500 mg total) by mouth daily with breakfast. 90 tablet 3    [DISCONTINUED] levothyroxine (TIROSINT) 50 mcg Cap Take 50 mcg by mouth once daily. 90 capsule 3    [DISCONTINUED] TIROSINT 50 mcg Cap Take 50 mcg by mouth before breakfast. 90 capsule 1     No current facility-administered medications on file prior to visit.        HEALTH MAINTENANCE THAT IS OVERDUE OR NEEDS TO BE UPDATED ON OUR CHART IS LISTED BELOW.  IF YOU HAVE HAD IT DONE ELSEWHERE, PLEASE SEND US DATES AND RECORDS IF YOU HAVE THEM TO MAKE YOUR CHART ACCURATE.  IF YOU HAVE NOT HAD THESE DONE AND ARE READY FOR US TO SCHEDULE THEM, PLEASE SEND US A MESSAGE.  Health Maintenance Due   Topic Date Due    HIV Screening  05/30/1974    TETANUS VACCINE  05/30/1977    Aspirin/Antiplatelet Therapy  05/30/1977    Cervical Cancer Screening  05/30/1980    Shingles Vaccine (1 of 2) 05/30/2009    Colorectal Cancer Screening  12/17/2018       DISCLAIMER: This note was compiled by using a speech recognition dictation system and therefore please be aware that typographical / speech recognition errors can and do occur.  Please contact me if you see any errors specifically.    Rick Vivas MD  We Offer Telehealth & Same Day Appointments!   Book your Telehealth appointment with me through my nurse or   Clinic appointments on [employer]!  [phone]     Check out my Facebook Page and Follow Me at: CLICK HERE    Check out my website at [employer] by clicking on: CLICK HERE    To Schedule appointments online, go to [employer]: CLICK HERE     Location: [email or web address]    [address] [place] [address]    FAX: [phone]

## 2020-11-10 NOTE — TELEPHONE ENCOUNTER
----- Message from Susie Buchanan sent at 11/10/2020  9:46 AM CST -----  Regarding: medication change  Contact: pt  Pt would like to change her thyroid medication Tirosint.  It is not working. Pt can be reached at [phone]

## 2020-11-10 NOTE — TELEPHONE ENCOUNTER
----- Message from Macho Perry sent at 11/10/2020 12:55 PM CST -----  Contact: self  Would like to consult with nurse regarding recent call regarding changing medication.  Pt states she does not want to take the Tirasint.  Please contact Jeannette Ramhan @[phone].  Thanks/As

## 2020-11-11 DIAGNOSIS — E11.65 TYPE 2 DIABETES MELLITUS WITH HYPERGLYCEMIA, WITHOUT LONG-TERM CURRENT USE OF INSULIN: ICD-10-CM

## 2020-11-12 RX ORDER — EMPAGLIFLOZIN 10 MG/1
10 TABLET, FILM COATED ORAL DAILY
Qty: 90 TABLET | Refills: 4 | Status: SHIPPED | OUTPATIENT
Start: 2020-11-12 | End: 2022-01-04

## 2020-12-15 DIAGNOSIS — E03.9 ACQUIRED HYPOTHYROIDISM: ICD-10-CM

## 2020-12-15 RX ORDER — LIOTHYRONINE SODIUM 25 UG/1
25 TABLET ORAL DAILY
Qty: 90 TABLET | Refills: 4 | Status: SHIPPED | OUTPATIENT
Start: 2020-12-15 | End: 2021-03-01 | Stop reason: SDUPTHER

## 2021-02-02 ENCOUNTER — PATIENT OUTREACH (OUTPATIENT)
Dept: ADMINISTRATIVE | Facility: HOSPITAL | Age: 61
End: 2021-02-02

## 2021-02-12 DIAGNOSIS — E78.5 HYPERLIPIDEMIA, UNSPECIFIED HYPERLIPIDEMIA TYPE: ICD-10-CM

## 2021-02-12 RX ORDER — ATORVASTATIN CALCIUM 80 MG/1
TABLET, FILM COATED ORAL
Qty: 90 TABLET | Refills: 1 | Status: SHIPPED | OUTPATIENT
Start: 2021-02-12 | End: 2021-07-27 | Stop reason: SDUPTHER

## 2021-02-18 LAB — HUMAN PAPILLOMAVIRUS (HPV): NORMAL

## 2021-03-01 ENCOUNTER — OFFICE VISIT (OUTPATIENT)
Dept: FAMILY MEDICINE | Facility: CLINIC | Age: 61
End: 2021-03-01
Payer: COMMERCIAL

## 2021-03-01 ENCOUNTER — LAB VISIT (OUTPATIENT)
Dept: LAB | Facility: HOSPITAL | Age: 61
End: 2021-03-01
Attending: INTERNAL MEDICINE
Payer: COMMERCIAL

## 2021-03-01 VITALS
HEIGHT: 63 IN | WEIGHT: 176 LBS | DIASTOLIC BLOOD PRESSURE: 79 MMHG | HEART RATE: 98 BPM | BODY MASS INDEX: 31.18 KG/M2 | SYSTOLIC BLOOD PRESSURE: 133 MMHG | TEMPERATURE: 98 F

## 2021-03-01 DIAGNOSIS — L65.9 HAIR LOSS: ICD-10-CM

## 2021-03-01 DIAGNOSIS — E11.9 TYPE 2 DIABETES MELLITUS WITHOUT COMPLICATION, WITHOUT LONG-TERM CURRENT USE OF INSULIN: Primary | ICD-10-CM

## 2021-03-01 DIAGNOSIS — Z13.220 ENCOUNTER FOR LIPID SCREENING FOR CARDIOVASCULAR DISEASE: ICD-10-CM

## 2021-03-01 DIAGNOSIS — E03.9 ACQUIRED HYPOTHYROIDISM: ICD-10-CM

## 2021-03-01 DIAGNOSIS — I73.9 PAD (PERIPHERAL ARTERY DISEASE): ICD-10-CM

## 2021-03-01 DIAGNOSIS — E78.2 MIXED HYPERLIPIDEMIA: Chronic | ICD-10-CM

## 2021-03-01 DIAGNOSIS — M16.0 PRIMARY OSTEOARTHRITIS OF BOTH HIPS: ICD-10-CM

## 2021-03-01 DIAGNOSIS — Z13.6 ENCOUNTER FOR LIPID SCREENING FOR CARDIOVASCULAR DISEASE: ICD-10-CM

## 2021-03-01 DIAGNOSIS — E11.9 TYPE 2 DIABETES MELLITUS WITHOUT COMPLICATION, WITHOUT LONG-TERM CURRENT USE OF INSULIN: ICD-10-CM

## 2021-03-01 DIAGNOSIS — E01.0 THYROMEGALY: ICD-10-CM

## 2021-03-01 PROBLEM — Z11.59 NEED FOR HEPATITIS C SCREENING TEST: Status: RESOLVED | Noted: 2019-08-20 | Resolved: 2021-03-01

## 2021-03-01 PROBLEM — I65.22 LEFT CAROTID ARTERY STENOSIS: Status: RESOLVED | Noted: 2019-09-23 | Resolved: 2021-03-01

## 2021-03-01 PROBLEM — H92.01 RIGHT EAR PAIN: Status: RESOLVED | Noted: 2019-08-20 | Resolved: 2021-03-01

## 2021-03-01 PROBLEM — I65.29 CAROTID ARTERY CALCIFICATION: Status: RESOLVED | Noted: 2019-09-16 | Resolved: 2021-03-01

## 2021-03-01 PROBLEM — H66.001 RIGHT ACUTE SUPPURATIVE OTITIS MEDIA: Status: RESOLVED | Noted: 2019-08-20 | Resolved: 2021-03-01

## 2021-03-01 PROBLEM — R09.89 CAROTID BRUIT: Status: RESOLVED | Noted: 2019-09-16 | Resolved: 2021-03-01

## 2021-03-01 PROBLEM — Z12.31 ENCOUNTER FOR SCREENING MAMMOGRAM FOR BREAST CANCER: Status: RESOLVED | Noted: 2019-08-20 | Resolved: 2021-03-01

## 2021-03-01 PROCEDURE — 99999 PR PBB SHADOW E&M-EST. PATIENT-LVL III: CPT | Mod: PBBFAC,,, | Performed by: INTERNAL MEDICINE

## 2021-03-01 PROCEDURE — 36415 COLL VENOUS BLD VENIPUNCTURE: CPT | Mod: PO

## 2021-03-01 PROCEDURE — 83036 HEMOGLOBIN GLYCOSYLATED A1C: CPT

## 2021-03-01 PROCEDURE — 1126F PR PAIN SEVERITY QUANTIFIED, NO PAIN PRESENT: ICD-10-PCS | Mod: S$GLB,,, | Performed by: INTERNAL MEDICINE

## 2021-03-01 PROCEDURE — 1126F AMNT PAIN NOTED NONE PRSNT: CPT | Mod: S$GLB,,, | Performed by: INTERNAL MEDICINE

## 2021-03-01 PROCEDURE — 82728 ASSAY OF FERRITIN: CPT

## 2021-03-01 PROCEDURE — 99999 PR PBB SHADOW E&M-EST. PATIENT-LVL III: ICD-10-PCS | Mod: PBBFAC,,, | Performed by: INTERNAL MEDICINE

## 2021-03-01 PROCEDURE — 83540 ASSAY OF IRON: CPT

## 2021-03-01 PROCEDURE — 99214 OFFICE O/P EST MOD 30 MIN: CPT | Mod: S$GLB,,, | Performed by: INTERNAL MEDICINE

## 2021-03-01 PROCEDURE — 84443 ASSAY THYROID STIM HORMONE: CPT

## 2021-03-01 PROCEDURE — 84439 ASSAY OF FREE THYROXINE: CPT

## 2021-03-01 PROCEDURE — 99214 PR OFFICE/OUTPT VISIT, EST, LEVL IV, 30-39 MIN: ICD-10-PCS | Mod: S$GLB,,, | Performed by: INTERNAL MEDICINE

## 2021-03-01 RX ORDER — LIOTHYRONINE SODIUM 25 UG/1
25 TABLET ORAL DAILY
Qty: 30 TABLET | Refills: 11 | Status: SHIPPED | OUTPATIENT
Start: 2021-03-01 | End: 2021-06-14 | Stop reason: SDUPTHER

## 2021-03-02 LAB
ESTIMATED AVG GLUCOSE: 146 MG/DL (ref 68–131)
FERRITIN SERPL-MCNC: 182 NG/ML (ref 20–300)
HBA1C MFR BLD: 6.7 % (ref 4–5.6)
IRON SERPL-MCNC: 63 UG/DL (ref 30–160)
SATURATED IRON: 15 % (ref 20–50)
T4 FREE SERPL-MCNC: 0.95 NG/DL (ref 0.71–1.51)
TOTAL IRON BINDING CAPACITY: 432 UG/DL (ref 250–450)
TRANSFERRIN SERPL-MCNC: 292 MG/DL (ref 200–375)
TSH SERPL DL<=0.005 MIU/L-ACNC: 4.07 UIU/ML (ref 0.4–4)

## 2021-03-15 ENCOUNTER — PATIENT OUTREACH (OUTPATIENT)
Dept: ADMINISTRATIVE | Facility: HOSPITAL | Age: 61
End: 2021-03-15

## 2021-04-26 ENCOUNTER — LAB VISIT (OUTPATIENT)
Dept: LAB | Facility: HOSPITAL | Age: 61
End: 2021-04-26
Attending: INTERNAL MEDICINE
Payer: COMMERCIAL

## 2021-04-26 DIAGNOSIS — E03.9 ACQUIRED HYPOTHYROIDISM: ICD-10-CM

## 2021-04-26 DIAGNOSIS — Z13.220 ENCOUNTER FOR LIPID SCREENING FOR CARDIOVASCULAR DISEASE: ICD-10-CM

## 2021-04-26 DIAGNOSIS — E11.9 TYPE 2 DIABETES MELLITUS WITHOUT COMPLICATION, WITHOUT LONG-TERM CURRENT USE OF INSULIN: ICD-10-CM

## 2021-04-26 DIAGNOSIS — Z13.6 ENCOUNTER FOR LIPID SCREENING FOR CARDIOVASCULAR DISEASE: ICD-10-CM

## 2021-04-26 LAB
ALBUMIN SERPL BCP-MCNC: 4 G/DL (ref 3.5–5.2)
ALP SERPL-CCNC: 83 U/L (ref 55–135)
ALT SERPL W/O P-5'-P-CCNC: 43 U/L (ref 10–44)
ANION GAP SERPL CALC-SCNC: 9 MMOL/L (ref 8–16)
AST SERPL-CCNC: 29 U/L (ref 10–40)
BILIRUB SERPL-MCNC: 0.6 MG/DL (ref 0.1–1)
BUN SERPL-MCNC: 13 MG/DL (ref 8–23)
CALCIUM SERPL-MCNC: 9.3 MG/DL (ref 8.7–10.5)
CHLORIDE SERPL-SCNC: 107 MMOL/L (ref 95–110)
CHOLEST SERPL-MCNC: 168 MG/DL (ref 120–199)
CHOLEST/HDLC SERPL: 3.1 {RATIO} (ref 2–5)
CO2 SERPL-SCNC: 25 MMOL/L (ref 23–29)
CREAT SERPL-MCNC: 0.7 MG/DL (ref 0.5–1.4)
ERYTHROCYTE [DISTWIDTH] IN BLOOD BY AUTOMATED COUNT: 12.3 % (ref 11.5–14.5)
EST. GFR  (AFRICAN AMERICAN): >60 ML/MIN/1.73 M^2
EST. GFR  (NON AFRICAN AMERICAN): >60 ML/MIN/1.73 M^2
GLUCOSE SERPL-MCNC: 123 MG/DL (ref 70–110)
HCT VFR BLD AUTO: 44.2 % (ref 37–48.5)
HDLC SERPL-MCNC: 54 MG/DL (ref 40–75)
HDLC SERPL: 32.1 % (ref 20–50)
HGB BLD-MCNC: 14.7 G/DL (ref 12–16)
LDLC SERPL CALC-MCNC: 85 MG/DL (ref 63–159)
MCH RBC QN AUTO: 31.5 PG (ref 27–31)
MCHC RBC AUTO-ENTMCNC: 33.3 G/DL (ref 32–36)
MCV RBC AUTO: 95 FL (ref 82–98)
NONHDLC SERPL-MCNC: 114 MG/DL
PLATELET # BLD AUTO: 299 K/UL (ref 150–450)
PMV BLD AUTO: 9.4 FL (ref 9.2–12.9)
POTASSIUM SERPL-SCNC: 4.3 MMOL/L (ref 3.5–5.1)
PROT SERPL-MCNC: 7.2 G/DL (ref 6–8.4)
RBC # BLD AUTO: 4.67 M/UL (ref 4–5.4)
SODIUM SERPL-SCNC: 141 MMOL/L (ref 136–145)
T4 FREE SERPL-MCNC: 0.57 NG/DL (ref 0.71–1.51)
TRIGL SERPL-MCNC: 145 MG/DL (ref 30–150)
TSH SERPL DL<=0.005 MIU/L-ACNC: 1.41 UIU/ML (ref 0.4–4)
WBC # BLD AUTO: 6.7 K/UL (ref 3.9–12.7)

## 2021-04-26 PROCEDURE — 80053 COMPREHEN METABOLIC PANEL: CPT | Performed by: INTERNAL MEDICINE

## 2021-04-26 PROCEDURE — 84439 ASSAY OF FREE THYROXINE: CPT | Performed by: INTERNAL MEDICINE

## 2021-04-26 PROCEDURE — 84443 ASSAY THYROID STIM HORMONE: CPT | Performed by: INTERNAL MEDICINE

## 2021-04-26 PROCEDURE — 85027 COMPLETE CBC AUTOMATED: CPT | Performed by: INTERNAL MEDICINE

## 2021-04-26 PROCEDURE — 36415 COLL VENOUS BLD VENIPUNCTURE: CPT | Mod: PO | Performed by: INTERNAL MEDICINE

## 2021-04-26 PROCEDURE — 80061 LIPID PANEL: CPT | Performed by: INTERNAL MEDICINE

## 2021-04-30 ENCOUNTER — TELEPHONE (OUTPATIENT)
Dept: RADIOLOGY | Facility: HOSPITAL | Age: 61
End: 2021-04-30

## 2021-05-03 ENCOUNTER — HOSPITAL ENCOUNTER (OUTPATIENT)
Dept: RADIOLOGY | Facility: HOSPITAL | Age: 61
Discharge: HOME | End: 2021-05-03
Attending: INTERNAL MEDICINE
Payer: COMMERCIAL

## 2021-05-03 DIAGNOSIS — E01.0 THYROMEGALY: ICD-10-CM

## 2021-05-03 PROCEDURE — 76536 US EXAM OF HEAD AND NECK: CPT | Mod: 26,,, | Performed by: RADIOLOGY

## 2021-05-03 PROCEDURE — 76536 US EXAM OF HEAD AND NECK: CPT | Mod: TC,PO

## 2021-05-03 PROCEDURE — 76536 US SOFT TISSUE HEAD NECK THYROID: ICD-10-PCS | Mod: 26,,, | Performed by: RADIOLOGY

## 2021-05-04 ENCOUNTER — PATIENT MESSAGE (OUTPATIENT)
Dept: FAMILY MEDICINE | Facility: CLINIC | Age: 61
End: 2021-05-04

## 2021-05-04 DIAGNOSIS — D35.1 PARATHYROID ADENOMA: Primary | ICD-10-CM

## 2021-05-06 ENCOUNTER — LAB VISIT (OUTPATIENT)
Dept: LAB | Facility: HOSPITAL | Age: 61
End: 2021-05-06
Attending: INTERNAL MEDICINE
Payer: COMMERCIAL

## 2021-05-06 ENCOUNTER — PATIENT MESSAGE (OUTPATIENT)
Dept: RESEARCH | Facility: HOSPITAL | Age: 61
End: 2021-05-06

## 2021-05-06 DIAGNOSIS — D35.1 PARATHYROID ADENOMA: ICD-10-CM

## 2021-05-06 PROCEDURE — 83970 ASSAY OF PARATHORMONE: CPT | Performed by: INTERNAL MEDICINE

## 2021-05-06 PROCEDURE — 82330 ASSAY OF CALCIUM: CPT | Performed by: INTERNAL MEDICINE

## 2021-05-06 PROCEDURE — 36415 COLL VENOUS BLD VENIPUNCTURE: CPT | Mod: PO | Performed by: INTERNAL MEDICINE

## 2021-05-06 PROCEDURE — 82306 VITAMIN D 25 HYDROXY: CPT | Performed by: INTERNAL MEDICINE

## 2021-05-06 PROCEDURE — 84100 ASSAY OF PHOSPHORUS: CPT | Performed by: INTERNAL MEDICINE

## 2021-05-07 LAB
25(OH)D3+25(OH)D2 SERPL-MCNC: 36 NG/ML (ref 30–96)
CA-I BLDV-SCNC: 1.31 MMOL/L (ref 1.06–1.42)
PHOSPHATE SERPL-MCNC: 4.2 MG/DL (ref 2.7–4.5)
PTH-INTACT SERPL-MCNC: 46 PG/ML (ref 9–77)

## 2021-05-25 ENCOUNTER — PATIENT OUTREACH (OUTPATIENT)
Dept: ADMINISTRATIVE | Facility: HOSPITAL | Age: 61
End: 2021-05-25

## 2021-06-02 ENCOUNTER — TELEPHONE (OUTPATIENT)
Dept: ADMINISTRATIVE | Facility: HOSPITAL | Age: 61
End: 2021-06-02

## 2021-06-07 ENCOUNTER — TELEPHONE (OUTPATIENT)
Dept: ADMINISTRATIVE | Facility: HOSPITAL | Age: 61
End: 2021-06-07

## 2021-06-14 DIAGNOSIS — E03.9 ACQUIRED HYPOTHYROIDISM: ICD-10-CM

## 2021-06-14 RX ORDER — LIOTHYRONINE SODIUM 25 UG/1
25 TABLET ORAL DAILY
Qty: 30 TABLET | Refills: 11 | Status: SHIPPED | OUTPATIENT
Start: 2021-06-14 | End: 2022-03-17

## 2021-07-21 NOTE — PROGRESS NOTES
CALL THE PATIENT to discuss results and document verification.  ^*^*^** check on the lipid panel which has not resulted yet.  Patient came in for lab results today but did not have the results.    I have sent a message to them with the interpretation (see below).  See if they have any questions and make a follow-up appointment if not already schedule and if needed.    Also please address any outstanding health maintenance that may be due: TETANUS VACCINE due on 05/30/1977  Pap Smear with HPV Cotest due on 05/30/1980  Colonoscopy due on 12/17/2018  ====================================    My interpretation that was sent to them through [employer]:    I have reviewed your recent blood work.     Your complete blood count is stable and within normal limits for the patient..  Slightly elevated platelet count which has been elevated in the past.  Will monitor on future blood work.  Thyroid  study is still slightly elevated.  Will increase Synthroid to 50 mcg recheck numbers in two months.  Your cholesterol is pending.    Your hemoglobin A1c is 5.9 which is improved from 6.3 from previous..  This test is gold standard screening test for diabetes.  It is a measures 3 months of your average blood sugar.  Recheck in three months.     [facility]  coding opportunities                       Number of suggestions actually used: 0         Patients insurance company: [employer] never responded to [facility]  coding request Detail Level: Simple

## 2021-07-27 DIAGNOSIS — E78.5 HYPERLIPIDEMIA, UNSPECIFIED HYPERLIPIDEMIA TYPE: ICD-10-CM

## 2021-07-27 RX ORDER — ATORVASTATIN CALCIUM 80 MG/1
80 TABLET, FILM COATED ORAL DAILY
Qty: 90 TABLET | Refills: 3 | Status: SHIPPED | OUTPATIENT
Start: 2021-07-27 | End: 2022-09-05

## 2021-08-12 ENCOUNTER — TELEPHONE (OUTPATIENT)
Dept: INTERNAL MEDICINE | Facility: CLINIC | Age: 61
End: 2021-08-12

## 2021-10-04 ENCOUNTER — PATIENT MESSAGE (OUTPATIENT)
Dept: ADMINISTRATIVE | Facility: HOSPITAL | Age: 61
End: 2021-10-04

## 2021-10-04 DIAGNOSIS — E11.9 TYPE 2 DIABETES MELLITUS WITHOUT COMPLICATION, WITHOUT LONG-TERM CURRENT USE OF INSULIN: ICD-10-CM

## 2021-10-04 DIAGNOSIS — E03.9 ACQUIRED HYPOTHYROIDISM: Primary | ICD-10-CM

## 2021-10-07 ENCOUNTER — PATIENT OUTREACH (OUTPATIENT)
Dept: ADMINISTRATIVE | Facility: HOSPITAL | Age: 61
End: 2021-10-07

## 2021-10-11 ENCOUNTER — LAB VISIT (OUTPATIENT)
Dept: LAB | Facility: HOSPITAL | Age: 61
End: 2021-10-11
Attending: INTERNAL MEDICINE
Payer: COMMERCIAL

## 2021-10-11 DIAGNOSIS — E03.9 ACQUIRED HYPOTHYROIDISM: ICD-10-CM

## 2021-10-11 DIAGNOSIS — E11.9 TYPE 2 DIABETES MELLITUS WITHOUT COMPLICATION, WITHOUT LONG-TERM CURRENT USE OF INSULIN: ICD-10-CM

## 2021-10-11 LAB
ANION GAP SERPL CALC-SCNC: 14 MMOL/L (ref 8–16)
BUN SERPL-MCNC: 18 MG/DL (ref 8–23)
CALCIUM SERPL-MCNC: 10 MG/DL (ref 8.7–10.5)
CHLORIDE SERPL-SCNC: 102 MMOL/L (ref 95–110)
CO2 SERPL-SCNC: 22 MMOL/L (ref 23–29)
CREAT SERPL-MCNC: 0.8 MG/DL (ref 0.5–1.4)
EST. GFR  (AFRICAN AMERICAN): >60 ML/MIN/1.73 M^2
EST. GFR  (NON AFRICAN AMERICAN): >60 ML/MIN/1.73 M^2
ESTIMATED AVG GLUCOSE: 148 MG/DL (ref 68–131)
GLUCOSE SERPL-MCNC: 140 MG/DL (ref 70–110)
HBA1C MFR BLD: 6.8 % (ref 4–5.6)
POTASSIUM SERPL-SCNC: 4.6 MMOL/L (ref 3.5–5.1)
SODIUM SERPL-SCNC: 138 MMOL/L (ref 136–145)
T4 FREE SERPL-MCNC: 0.46 NG/DL (ref 0.71–1.51)
TSH SERPL DL<=0.005 MIU/L-ACNC: 3.28 UIU/ML (ref 0.4–4)

## 2021-10-11 PROCEDURE — 84439 ASSAY OF FREE THYROXINE: CPT | Performed by: INTERNAL MEDICINE

## 2021-10-11 PROCEDURE — 84443 ASSAY THYROID STIM HORMONE: CPT | Performed by: INTERNAL MEDICINE

## 2021-10-11 PROCEDURE — 36415 COLL VENOUS BLD VENIPUNCTURE: CPT | Mod: PO | Performed by: INTERNAL MEDICINE

## 2021-10-11 PROCEDURE — 83036 HEMOGLOBIN GLYCOSYLATED A1C: CPT | Performed by: INTERNAL MEDICINE

## 2021-10-11 PROCEDURE — 80048 BASIC METABOLIC PNL TOTAL CA: CPT | Performed by: INTERNAL MEDICINE

## 2021-10-20 ENCOUNTER — TELEPHONE (OUTPATIENT)
Dept: FAMILY MEDICINE | Facility: CLINIC | Age: 61
End: 2021-10-20

## 2021-10-20 DIAGNOSIS — E03.9 ACQUIRED HYPOTHYROIDISM: Primary | ICD-10-CM

## 2021-10-21 ENCOUNTER — PATIENT OUTREACH (OUTPATIENT)
Dept: ADMINISTRATIVE | Facility: HOSPITAL | Age: 61
End: 2021-10-21

## 2022-01-01 DIAGNOSIS — E11.65 TYPE 2 DIABETES MELLITUS WITH HYPERGLYCEMIA, WITHOUT LONG-TERM CURRENT USE OF INSULIN: ICD-10-CM

## 2022-01-02 NOTE — TELEPHONE ENCOUNTER
Care Due:                  Date            Visit Type   Department     Provider  --------------------------------------------------------------------------------                                             [facility]  Last Visit: 03-      None         MEDICINE       Tejal Narayan  Next Visit: None Scheduled  None         None Found                                                            Last  Test          Frequency    Reason                     Performed    Due Date  --------------------------------------------------------------------------------    Office Visit  12 months..  atorvastatin,              03- 02-                             empagliflozin,                             liothyronine.............    Powered by [employer]. Reference number: [id number].   1/01/2022 9:59:14 PM CST

## 2022-01-04 RX ORDER — EMPAGLIFLOZIN 10 MG/1
TABLET, FILM COATED ORAL
Qty: 90 TABLET | Refills: 1 | Status: SHIPPED | OUTPATIENT
Start: 2022-01-04 | End: 2022-06-21

## 2022-03-10 DIAGNOSIS — E03.9 ACQUIRED HYPOTHYROIDISM: ICD-10-CM

## 2022-03-10 NOTE — TELEPHONE ENCOUNTER
Care Due:                  Date            Visit Type   Department     Provider  --------------------------------------------------------------------------------                                EP -                              PRIMARY      [facility] FAM[facility]  Last Visit: 03-      CARE ([facility])   MEDICINE       Tejal Narayan  Next Visit: None Scheduled  None         None Found                                                            Last  Test          Frequency    Reason                     Performed    Due Date  --------------------------------------------------------------------------------    Office Visit  12 months..  dimas EASTON,   03- 02-                             liothyronine.............    CMP.........  12 months..  atorvastatin.............  04- 04-    HBA1C.......  6 months...  JARDIANCE................  10-   04-    Lipid Panel.  12 months..  atorvastatin.............  04- 04-    Powered by [employer] by [employer]. Reference number: [id number].   3/10/2022 3:37:08 AM CST

## 2022-03-17 RX ORDER — LIOTHYRONINE SODIUM 25 UG/1
TABLET ORAL
Qty: 90 TABLET | Refills: 0 | Status: SHIPPED | OUTPATIENT
Start: 2022-03-17 | End: 2022-06-01

## 2022-03-18 NOTE — TELEPHONE ENCOUNTER
Refill Authorization Note   Jeannette Rahman  is requesting a refill authorization.  Brief Assessment and Rationale for Refill:  Approve     Medication Therapy Plan:       Medication Reconciliation Completed: No   Comments:   --->Care Gap information included below if applicable.   Orders Placed This Encounter    liothyronine (CYTOMEL) 25 MCG Tab      Requested Prescriptions   Signed Prescriptions Disp Refills    liothyronine (CYTOMEL) 25 MCG Tab 90 tablet 0     Sig: TAKE 1 TABLET BY MOUTH ONCE DAILY       Endocrinology:  Hypothyroid Agents Passed - 3/10/2022  3:36 AM        Passed - Patient is at least 18 years old        Passed - Valid encounter within last 15 months     Recent Visits  Date Type Provider Dept   03/01/21 Office Visit Tejal Narayan MD [facility]   Showing recent visits within past 720 days and meeting all other requirements  Future Appointments  No visits were found meeting these conditions.  Showing future appointments within next 150 days and meeting all other requirements      Future Appointments              In 1 month SPECIMEN, [facility]    In 1 month LABORATORY, [facility]                Passed - Manual Review: FT4 is not required if last TSH is WNL.        Passed - TSH in normal range and within 360 days     TSH   Date Value Ref Range Status   10/11/2021 3.276 0.400 - 4.000 uIU/mL Final   04/26/2021 1.414 0.400 - 4.000 uIU/mL Final   03/01/2021 4.073 (H) 0.400 - 4.000 uIU/mL Final              Passed - T4 free within 1080 days     Free T4   Date Value Ref Range Status   10/11/2021 0.46 (L) 0.71 - 1.51 ng/dL Final   04/26/2021 0.57 (L) 0.71 - 1.51 ng/dL Final   03/01/2021 0.95 0.71 - 1.51 ng/dL Final                  Appointments  past 12m or future 3m with PCP    Date Provider   Last Visit   3/1/2021 Tejal Narayan MD   Next Visit   Visit date not found Tejal Narayan MD   ED visits in past 90 days: 0     Note composed:8:15 PM  03/17/2022

## 2022-03-31 ENCOUNTER — PATIENT MESSAGE (OUTPATIENT)
Dept: FAMILY MEDICINE | Facility: CLINIC | Age: 62
End: 2022-03-31

## 2022-03-31 ENCOUNTER — TELEPHONE (OUTPATIENT)
Dept: FAMILY MEDICINE | Facility: CLINIC | Age: 62
End: 2022-03-31

## 2022-03-31 ENCOUNTER — OFFICE VISIT (OUTPATIENT)
Dept: FAMILY MEDICINE | Facility: CLINIC | Age: 62
End: 2022-03-31
Payer: COMMERCIAL

## 2022-03-31 VITALS
DIASTOLIC BLOOD PRESSURE: 100 MMHG | SYSTOLIC BLOOD PRESSURE: 168 MMHG | BODY MASS INDEX: 29.68 KG/M2 | WEIGHT: 167.5 LBS | HEART RATE: 104 BPM | HEIGHT: 63 IN

## 2022-03-31 DIAGNOSIS — F41.9 ANXIETY: Primary | ICD-10-CM

## 2022-03-31 DIAGNOSIS — I10 ELEVATED BLOOD PRESSURE READING IN OFFICE WITH DIAGNOSIS OF HYPERTENSION: ICD-10-CM

## 2022-03-31 PROCEDURE — 99213 OFFICE O/P EST LOW 20 MIN: CPT | Mod: S$GLB,,, | Performed by: NURSE PRACTITIONER

## 2022-03-31 PROCEDURE — 99999 PR PBB SHADOW E&M-EST. PATIENT-LVL IV: CPT | Mod: PBBFAC,,, | Performed by: NURSE PRACTITIONER

## 2022-03-31 PROCEDURE — 99213 PR OFFICE/OUTPT VISIT, EST, LEVL III, 20-29 MIN: ICD-10-PCS | Mod: S$GLB,,, | Performed by: NURSE PRACTITIONER

## 2022-03-31 PROCEDURE — 99999 PR PBB SHADOW E&M-EST. PATIENT-LVL IV: ICD-10-PCS | Mod: PBBFAC,,, | Performed by: NURSE PRACTITIONER

## 2022-03-31 RX ORDER — HYDROXYZINE PAMOATE 25 MG/1
25 CAPSULE ORAL EVERY 12 HOURS PRN
Qty: 20 CAPSULE | Refills: 0 | Status: SHIPPED | OUTPATIENT
Start: 2022-03-31 | End: 2022-04-10

## 2022-03-31 NOTE — TELEPHONE ENCOUNTER
----- Message from Di Argueta sent at 3/31/2022  3:59 PM CDT -----  Contact: self  Jeannette Rahman would like a call back at [phone], in regards to if the message she was sent through [employer] can be sent to her e-mail so she can print it. Pt would like to thank you for all of your help.

## 2022-03-31 NOTE — PATIENT INSTRUCTIONS
"Vistaril can cause drowsiness  Consider psychiatry  Report to ER immediately if symptoms worsen or persist    Hydroxyzine: Patient drug information   Access [employer] for additional drug information, tools, and databases.   Copyright 2532-6274 [employer] All rights reserved.   Contributor Disclosures  (For additional information see "Hydroxyzine: Drug information" and see "Hydroxyzine: Pediatric drug information")    You must carefully read the "Consumer Information Use and Disclaimer" below in order to understand and correctly use this information.  Brand Names: [place]   Vistaril  What is this drug used for?    It is used to treat itching.    It is used to treat anxiety.    It is used to put you to sleep for surgery.    It is used to treat mood problems.    It is used to treat upset stomach and throwing up.    It may be given to you for other reasons. Talk with the doctor.  What do I need to tell my doctor BEFORE I take this drug?    If you are allergic to this drug; any part of this drug; or any other drugs, foods, or substances. Tell your doctor about the allergy and what signs you had.    If you have ever had a long QT on ECG.    If you are in early pregnancy. Do not take this drug during early pregnancy.    If you are breast-feeding. Do not breast-feed while you take this drug.   This is not a list of all drugs or health problems that interact with this drug.   Tell your doctor and pharmacist about all of your drugs (prescription or OTC, natural products, vitamins) and health problems. You must check to make sure that it is safe for you to take this drug with all of your drugs and health problems. Do not start, stop, or change the dose of any drug without checking with your doctor.  What are some things I need to know or do while I take this drug?   All products:    Tell all of your health care providers that you take this drug. This includes your doctors, nurses, pharmacists, and dentists.    Avoid " driving and doing other tasks or actions that call for you to be alert until you see how this drug affects you.    Talk with your doctor before you use alcohol, marijuana or other forms of cannabis, or prescription or OTC drugs that may slow your actions.    An unsafe heartbeat that is not normal (long QT on ECG) has happened with this drug. This may raise the chance of sudden death. Talk with the doctor.    If you are 65 or older, use this drug with care. You could have more side effects.    Tell your doctor if you are pregnant or plan on getting pregnant. You will need to talk about the benefits and risks of using this drug while you are pregnant.   Injection:    Unsafe heart problems and sometimes death have rarely happened when this drug was given with alcohol or certain other drugs that may slow your actions. Talk with your doctor.  What are some side effects that I need to call my doctor about right away?   WARNING/CAUTION: Even though it may be rare, some people may have very bad and sometimes deadly side effects when taking a drug. Tell your doctor or get medical help right away if you have any of the following signs or symptoms that may be related to a very bad side effect:   All products:    Signs of an allergic reaction, like rash; hives; itching; red, swollen, blistered, or peeling skin with or without fever; wheezing; tightness in the chest or throat; trouble breathing, swallowing, or talking; unusual hoarseness; or swelling of the mouth, face, lips, tongue, or throat.    Fast or abnormal heartbeat.    Very bad dizziness or passing out.    Trouble controlling body movements.    Feeling confused.    Rarely, a very bad skin reaction has happened with this drug. Signs include fever and many small skin spots within large areas of redness and swelling. Call your doctor right away if you have a rash or any of these signs.   Injection:    Tissue damage has happened with this drug. Sometimes, this has led to  surgery. Tell your nurse if you have any burning, color changes, pain, skin breakdown, or swelling where the shot was given.  What are some other side effects of this drug?   All drugs may cause side effects. However, many people have no side effects or only have minor side effects. Call your doctor or get medical help if any of these side effects or any other side effects bother you or do not go away:    Dry mouth.    Feeling sleepy.   These are not all of the side effects that may occur. If you have questions about side effects, call your doctor. Call your doctor for medical advice about side effects.   You may report side effects to your national health agency.  How is this drug best taken?   Use this drug as ordered by your doctor. Read all information given to you. Follow all instructions closely.   All oral products:    Take with or without food. Take with food if it causes an upset stomach.   Liquid:    Measure liquid doses carefully. Use the measuring device that comes with this drug. If there is none, ask the pharmacist for a device to measure this drug.   Injection:    It is given as a shot into a muscle.  What do I do if I miss a dose?   All oral products:    If you take this drug on a regular basis, take a missed dose as soon as you think about it.    If it is close to the time for your next dose, skip the missed dose and go back to your normal time.    Do not take 2 doses at the same time or extra doses.    Many times this drug is taken on an as needed basis. Do not take more often than told by the doctor.   Injection:    Call your doctor to find out what to do.  How do I store and/or throw out this drug?   All oral products:    Store at room temperature protected from light. Store in a dry place. Do not store in a bathroom.   Injection:    If you need to store this drug at home, talk with your doctor, nurse, or pharmacist about how to store it.   All products:    Keep all drugs in a safe place. Keep  all drugs out of the reach of children and pets.    Throw away unused or  drugs. Do not flush down a toilet or pour down a drain unless you are told to do so. Check with your pharmacist if you have questions about the best way to throw out drugs. There may be drug take-back programs in your area.  General drug facts    If your symptoms or health problems do not get better or if they become worse, call your doctor.    Do not share your drugs with others and do not take anyone else's drugs.    Some drugs may have another patient information leaflet. If you have any questions about this drug, please talk with your doctor, nurse, pharmacist, or other health care provider.    If you think there has been an overdose, call your poison control center or get medical care right away. Be ready to tell or show what was taken, how much, and when it happened.

## 2022-03-31 NOTE — PROGRESS NOTES
"Subjective:       Patient ID: Jeannette Rahman is a 61 y.o. female.    Chief Complaint: Anxiety (When driving on the interstate. )    Anxiety  Presents for initial visit. Onset was 1 to 5 years ago (Pt states has never taken medication for anxiety). The problem has been unchanged. Symptoms include nervous/anxious behavior. Patient reports no chest pain, compulsions, confusion, decreased concentration, depressed mood, dizziness, dry mouth, excessive worry, feeling of choking, hyperventilation, impotence, insomnia, irritability, malaise, muscle tension, nausea, obsessions, palpitations, panic, restlessness, shortness of breath or suicidal ideas. Symptoms occur occasionally (Pt and  state "only happens when in the car."). The severity of symptoms is moderate. Exacerbated by: Pt and  state "only happens when in the car. The quality of sleep is good. Nighttime awakenings: none.     There are no known risk factors. There is no history of anemia, anxiety/panic attacks, arrhythmia, asthma, bipolar disorder, CAD, CHF, chronic lung disease, depression, fibromyalgia, hyperthyroidism or suicide attempts. Past treatments include nothing (Pt states does not want medication for anxiety; states may try vistaril).   Pt and  requesting documentation stating pt has anxiety and cannot participate in jury duty; states, "I get anxious when I'm in the car. I can't drive or ride. I drive to [facility] and back home. I can't drive any farther than that. I can't drive to [place]."  BP elevated today; pt states, "I'm anxious about coming here and the situation." Denies CP, HA, SOB, dizziness. Pt has no other complaints today.  Past Medical History:   Diagnosis Date    Anticoagulant long-term use     Arthritis     Colon polyp     Diabetes     Thyroid disease      Social History     Socioeconomic History    Marital status:    Tobacco Use    Smoking status: Former Smoker     Packs/day: 0.00     Years: 2.00     " Pack years: 0.00     Types: Cigarettes     Quit date:      Years since quittin.2    Smokeless tobacco: Never Used   Substance and Sexual Activity    Alcohol use: Yes     Comment: socially    Drug use: No     Past Surgical History:   Procedure Laterality Date    bilateral carpal tunnel release      CAROTID ENDARTERECTOMY Left 10/29/2019    Procedure: ENDARTERECTOMY-CAROTID;  Surgeon: Bhanu Read MD;  Location: [facility];  Service: Cardiovascular;  Laterality: Left;    COLONOSCOPY  2013         HIP SURGERY      JOINT REPLACEMENT Right     hip       Review of Systems   Constitutional: Negative.  Negative for irritability.   HENT: Negative.    Eyes: Negative.    Respiratory: Negative.  Negative for shortness of breath.    Cardiovascular: Negative.  Negative for chest pain and palpitations.   Gastrointestinal: Negative.  Negative for nausea.   Endocrine: Negative.    Genitourinary: Negative.  Negative for impotence.   Musculoskeletal: Negative.    Integumentary:  Negative.   Allergic/Immunologic: Negative.    Neurological: Negative.  Negative for dizziness.   Psychiatric/Behavioral: Negative for confusion, decreased concentration and suicidal ideas. The patient is nervous/anxious. The patient does not have insomnia.          Objective:      Physical Exam  Vitals and nursing note reviewed.   Constitutional:       Appearance: Normal appearance.   HENT:      Head: Normocephalic.      Right Ear: Tympanic membrane, ear canal and external ear normal.      Left Ear: Tympanic membrane, ear canal and external ear normal.      Nose: Nose normal.      Mouth/Throat:      Mouth: Mucous membranes are moist.      Pharynx: Oropharynx is clear.   Eyes:      Conjunctiva/sclera: Conjunctivae normal.      Pupils: Pupils are equal, round, and reactive to light.   Cardiovascular:      Rate and Rhythm: Regular rhythm. Tachycardia present.      Pulses: Normal pulses.      Heart sounds: Normal heart sounds.    Pulmonary:      Effort: Pulmonary effort is normal.      Breath sounds: Normal breath sounds.   Abdominal:      General: Bowel sounds are normal.      Palpations: Abdomen is soft.   Musculoskeletal:         General: Normal range of motion.      Cervical back: Normal range of motion and neck supple.   Skin:     General: Skin is warm and dry.      Capillary Refill: Capillary refill takes 2 to 3 seconds.   Neurological:      Mental Status: She is alert and oriented to person, place, and time.   Psychiatric:         Mood and Affect: Mood is anxious.         Behavior: Behavior normal.         Thought Content: Thought content normal.         Judgment: Judgment normal.         Assessment:       Problem List Items Addressed This Visit    None     Visit Diagnoses     Anxiety    -  Primary     Elevated blood pressure reading in office with diagnosis of hypertension            Plan:         Jeannette TORIBIO was seen today for anxiety.    Diagnoses and all orders for this visit:    Anxiety  Elevated blood pressure reading in office with diagnosis of hypertension  -     hydrOXYzine pamoate (VISTARIL) 25 MG Cap; Take 1 capsule (25 mg total) by mouth every 12 (twelve) hours as needed.  Handout r/t vistaril uses, potential side effects/interactions given  Consider psychiatry  Letter r/t anxiety sent via [employer]  [facility] in 4 w  Monitor BP daily; keep log x 1w  Return log to clinic for review  Report to ER immediately if symptoms worsen

## 2022-04-12 LAB
LEFT EYE DM RETINOPATHY: NEGATIVE
RIGHT EYE DM RETINOPATHY: NEGATIVE

## 2022-04-26 ENCOUNTER — PATIENT MESSAGE (OUTPATIENT)
Dept: ADMINISTRATIVE | Facility: HOSPITAL | Age: 62
End: 2022-04-26
Payer: COMMERCIAL

## 2022-04-28 ENCOUNTER — PATIENT OUTREACH (OUTPATIENT)
Dept: ADMINISTRATIVE | Facility: HOSPITAL | Age: 62
End: 2022-04-28
Payer: COMMERCIAL

## 2022-04-28 NOTE — LETTER
AUTHORIZATION FOR RELEASE OF   CONFIDENTIAL INFORMATION        We are seeing Jeannette Rahman, date of birth 1960, in the clinic at [facility]. Tejal Narayan MD is the patient's PCP. Jeannette Rahman has an outstanding lab/procedure at the time we reviewed her chart. In order to help keep her health information updated, she has authorized us to request the following medical record(s):        (  )  MAMMOGRAM                                      (  )  COLONOSCOPY      (  )  PAP SMEAR                                          (  )  OUTSIDE LAB RESULTS     (  )  DEXA SCAN                                          ( XX )  DIABETIC EYE EXAM            (  )  FOOT EXAM                                          (  )  ENTIRE RECORD     (  )  OUTSIDE IMMUNIZATIONS                 (  )  _______________         Please fax records to [facility], Kacie S Watts, MD, [phone]     If you have any questions, please contact Stacey Conner        Patient Name: Jeannette Rahman  : 1960  Patient Phone #: [phone]

## 2022-05-27 ENCOUNTER — PATIENT MESSAGE (OUTPATIENT)
Dept: FAMILY MEDICINE | Facility: CLINIC | Age: 62
End: 2022-05-27
Payer: COMMERCIAL

## 2022-05-31 DIAGNOSIS — E03.9 ACQUIRED HYPOTHYROIDISM: ICD-10-CM

## 2022-06-01 RX ORDER — LIOTHYRONINE SODIUM 25 UG/1
TABLET ORAL
Qty: 90 TABLET | Refills: 0 | Status: SHIPPED | OUTPATIENT
Start: 2022-06-01 | End: 2022-09-02

## 2022-06-01 NOTE — TELEPHONE ENCOUNTER
Care Due:                  Date            Visit Type   Department     Provider  --------------------------------------------------------------------------------                                EP -                              PRIMARY      [facility]  Last Visit: 03-      CARE ([facility])   MEDICINE       Tejal Narayan  Next Visit: None Scheduled  None         None Found                                                            Last  Test          Frequency    Reason                     Performed    Due Date  --------------------------------------------------------------------------------    Office Visit  12 months..  RUSTAM atorvastatin..  03- 02-    CMP.........  12 months..  atorvastatin.............  04- 04-    HBA1C.......  6 months...  JARDIANCE................  10-   04-    Lipid Panel.  12 months..  atorvastatin.............  04- 04-    Health [facility] Embedded Care Gaps. Reference number: [id number]. 5/31/2022   10:27:21 PM CDT

## 2022-06-20 DIAGNOSIS — E11.65 TYPE 2 DIABETES MELLITUS WITH HYPERGLYCEMIA, WITHOUT LONG-TERM CURRENT USE OF INSULIN: ICD-10-CM

## 2022-06-21 RX ORDER — EMPAGLIFLOZIN 10 MG/1
TABLET, FILM COATED ORAL
Qty: 90 TABLET | Refills: 0 | Status: SHIPPED | OUTPATIENT
Start: 2022-06-21 | End: 2022-09-14

## 2022-06-21 NOTE — TELEPHONE ENCOUNTER
Refill Routing Note   Medication(s) are not appropriate for processing by [facility] for the following reason(s):      - Patient has not been seen in over 15 months by PCP  - Required laboratory values are outdated    [facility] action(s):  Defer          Medication reconciliation completed: No     Appointments  past 12m or future 3m with PCP    Date Provider   Last Visit   11/25/2019 Rick Vivas MD   Next Visit   Visit date not found Rick Vivas MD   ED visits in past 90 days: 0        Note composed:2:37 PM 06/21/2022

## 2022-06-21 NOTE — TELEPHONE ENCOUNTER
No new care gaps identified.  [facility] Embedded Care Gaps. Reference number: [id number]. 6/20/2022   9:24:32 PM CDT

## 2022-08-25 ENCOUNTER — PATIENT OUTREACH (OUTPATIENT)
Dept: ADMINISTRATIVE | Facility: HOSPITAL | Age: 62
End: 2022-08-25
Payer: COMMERCIAL

## 2022-08-26 ENCOUNTER — LAB VISIT (OUTPATIENT)
Dept: LAB | Facility: HOSPITAL | Age: 62
End: 2022-08-26
Attending: INTERNAL MEDICINE
Payer: COMMERCIAL

## 2022-08-26 DIAGNOSIS — E11.9 TYPE 2 DIABETES MELLITUS WITHOUT COMPLICATION, WITHOUT LONG-TERM CURRENT USE OF INSULIN: ICD-10-CM

## 2022-08-26 DIAGNOSIS — Z13.6 ENCOUNTER FOR LIPID SCREENING FOR CARDIOVASCULAR DISEASE: ICD-10-CM

## 2022-08-26 DIAGNOSIS — E03.9 ACQUIRED HYPOTHYROIDISM: ICD-10-CM

## 2022-08-26 DIAGNOSIS — Z13.220 ENCOUNTER FOR LIPID SCREENING FOR CARDIOVASCULAR DISEASE: ICD-10-CM

## 2022-08-26 LAB
ALBUMIN SERPL BCP-MCNC: 4.2 G/DL (ref 3.5–5.2)
ALBUMIN/CREAT UR: 8.9 UG/MG (ref 0–30)
ALP SERPL-CCNC: 78 U/L (ref 55–135)
ALT SERPL W/O P-5'-P-CCNC: 32 U/L (ref 10–44)
ANION GAP SERPL CALC-SCNC: 12 MMOL/L (ref 8–16)
AST SERPL-CCNC: 25 U/L (ref 10–40)
BILIRUB SERPL-MCNC: 0.8 MG/DL (ref 0.1–1)
BUN SERPL-MCNC: 20 MG/DL (ref 8–23)
CALCIUM SERPL-MCNC: 10 MG/DL (ref 8.7–10.5)
CHLORIDE SERPL-SCNC: 104 MMOL/L (ref 95–110)
CHOLEST SERPL-MCNC: 230 MG/DL (ref 120–199)
CHOLEST/HDLC SERPL: 3.9 {RATIO} (ref 2–5)
CO2 SERPL-SCNC: 24 MMOL/L (ref 23–29)
CREAT SERPL-MCNC: 0.7 MG/DL (ref 0.5–1.4)
CREAT UR-MCNC: 124 MG/DL (ref 15–325)
EST. GFR  (NO RACE VARIABLE): >60 ML/MIN/1.73 M^2
ESTIMATED AVG GLUCOSE: 134 MG/DL (ref 68–131)
GLUCOSE SERPL-MCNC: 150 MG/DL (ref 70–110)
HBA1C MFR BLD: 6.3 % (ref 4–5.6)
HDLC SERPL-MCNC: 59 MG/DL (ref 40–75)
HDLC SERPL: 25.7 % (ref 20–50)
LDLC SERPL CALC-MCNC: 122.4 MG/DL (ref 63–159)
MICROALBUMIN UR DL<=1MG/L-MCNC: 11 UG/ML
NONHDLC SERPL-MCNC: 171 MG/DL
POTASSIUM SERPL-SCNC: 4.4 MMOL/L (ref 3.5–5.1)
PROT SERPL-MCNC: 7.4 G/DL (ref 6–8.4)
SODIUM SERPL-SCNC: 140 MMOL/L (ref 136–145)
T4 FREE SERPL-MCNC: <0.42 NG/DL (ref 0.71–1.51)
TRIGL SERPL-MCNC: 243 MG/DL (ref 30–150)
TSH SERPL DL<=0.005 MIU/L-ACNC: 7.78 UIU/ML (ref 0.4–4)

## 2022-08-26 PROCEDURE — 82570 ASSAY OF URINE CREATININE: CPT | Performed by: INTERNAL MEDICINE

## 2022-08-26 PROCEDURE — 84443 ASSAY THYROID STIM HORMONE: CPT | Performed by: INTERNAL MEDICINE

## 2022-08-26 PROCEDURE — 80053 COMPREHEN METABOLIC PANEL: CPT | Performed by: INTERNAL MEDICINE

## 2022-08-26 PROCEDURE — 80061 LIPID PANEL: CPT | Performed by: INTERNAL MEDICINE

## 2022-08-26 PROCEDURE — 83036 HEMOGLOBIN GLYCOSYLATED A1C: CPT | Performed by: INTERNAL MEDICINE

## 2022-08-26 PROCEDURE — 36415 COLL VENOUS BLD VENIPUNCTURE: CPT | Mod: PO | Performed by: INTERNAL MEDICINE

## 2022-08-26 PROCEDURE — 84439 ASSAY OF FREE THYROXINE: CPT | Performed by: INTERNAL MEDICINE

## 2022-09-02 DIAGNOSIS — E03.9 ACQUIRED HYPOTHYROIDISM: Primary | Chronic | ICD-10-CM

## 2022-09-02 RX ORDER — LIOTHYRONINE SODIUM 5 UG/1
5 TABLET ORAL DAILY
Qty: 30 TABLET | Refills: 11 | Status: SHIPPED | OUTPATIENT
Start: 2022-09-02 | End: 2023-06-05 | Stop reason: SDUPTHER

## 2022-09-02 RX ORDER — LEVOTHYROXINE SODIUM 50 UG/1
50 TABLET ORAL
Qty: 30 TABLET | Refills: 11 | Status: SHIPPED | OUTPATIENT
Start: 2022-09-02 | End: 2023-06-05 | Stop reason: SDUPTHER

## 2022-09-02 NOTE — PROGRESS NOTES
Results have been released via [employer]. Please verify that these have been viewed by patient. If not, please call patient with results.    Please schedule the following orders:  Tsh, free t4, t3 in 6 weeks    I have sent a message to them with the following interpretation (see below).      I have reviewed your recent blood work.     Your metabolic panel which shows your electrolytes, glucose, kidney and liver function is within normal limits.   l  Your cholesterol is at acceptable ranges.     Thyroid studies are abnormal. I recommend that we put you on combination therapy with the cytomel and levothyroxine. I sent in new prescriptions for both and we will need to repeat the labs in 6 weeks.     Your hemoglobin A1c has improved to 6.3.     Please do not hesitate to call or message with any additional questions or concerns.    Tejal Narayan MD

## 2022-09-06 ENCOUNTER — OFFICE VISIT (OUTPATIENT)
Dept: FAMILY MEDICINE | Facility: CLINIC | Age: 62
End: 2022-09-06
Payer: COMMERCIAL

## 2022-09-06 VITALS
HEART RATE: 87 BPM | BODY MASS INDEX: 29.06 KG/M2 | DIASTOLIC BLOOD PRESSURE: 85 MMHG | HEIGHT: 63 IN | WEIGHT: 164 LBS | TEMPERATURE: 98 F | SYSTOLIC BLOOD PRESSURE: 137 MMHG

## 2022-09-06 DIAGNOSIS — E11.9 TYPE 2 DIABETES MELLITUS WITHOUT COMPLICATION, WITHOUT LONG-TERM CURRENT USE OF INSULIN: ICD-10-CM

## 2022-09-06 DIAGNOSIS — Z71.9 ENCOUNTER FOR EDUCATION: Primary | ICD-10-CM

## 2022-09-06 PROCEDURE — 99213 PR OFFICE/OUTPT VISIT, EST, LEVL III, 20-29 MIN: ICD-10-PCS | Mod: S$GLB,,, | Performed by: NURSE PRACTITIONER

## 2022-09-06 PROCEDURE — 99999 PR PBB SHADOW E&M-EST. PATIENT-LVL V: CPT | Mod: PBBFAC,,, | Performed by: NURSE PRACTITIONER

## 2022-09-06 PROCEDURE — 99213 OFFICE O/P EST LOW 20 MIN: CPT | Mod: S$GLB,,, | Performed by: NURSE PRACTITIONER

## 2022-09-06 PROCEDURE — 99999 PR PBB SHADOW E&M-EST. PATIENT-LVL V: ICD-10-PCS | Mod: PBBFAC,,, | Performed by: NURSE PRACTITIONER

## 2022-09-06 NOTE — PATIENT INSTRUCTIONS
Continue current medications  Jodi f/u with PCP  Prefer BP < 140/90  Report to ER immediately if symptoms worsen or persist

## 2022-09-06 NOTE — PROGRESS NOTES
"Subjective:       Patient ID: Jeannette Rahman is a 62 y.o. female.    Chief Complaint: Follow-up (Test results. )  Pt in today to discuss diet. Pt states originally scheduled for lab review but was called by her PCP's staff on Friday with review of results and recommendations. Pt has type 2 diabetes; states is eating "low carb" but is consuming moderate amounts of red meat, pork; advised to limit consumption of these, along with high sodium foods, fried foods. Pt states has not seen dietician since diabetes diagnosis. Type 2 diabetes currently controlled. HTN parameters also discussed; BP acceptable today, however pt inquired about "when to worry about blood pressure." BP at last visit in March elevated, however pt was experiencing anxiety at that time; also did not f/u as advised. Pt has no other complaints today.  Past Medical History:   Diagnosis Date    Anticoagulant long-term use     Arthritis     Colon polyp     Diabetes     Thyroid disease      Social History     Socioeconomic History    Marital status:    Tobacco Use    Smoking status: Former     Packs/day: 0.00     Years: 2.00     Pack years: 0.00     Types: Cigarettes     Quit date:      Years since quittin.6    Smokeless tobacco: Never   Substance and Sexual Activity    Alcohol use: Yes     Comment: socially    Drug use: No    Sexual activity: Not Currently       Past Surgical History:   Procedure Laterality Date    bilateral carpal tunnel release      CAROTID ENDARTERECTOMY Left 10/29/2019    Procedure: ENDARTERECTOMY-CAROTID;  Surgeon: Bhanu Read MD;  Location: [facility];  Service: Cardiovascular;  Laterality: Left;    COLONOSCOPY  2013         HIP SURGERY      JOINT REPLACEMENT Right     hip       HPI  Review of Systems   Constitutional: Negative.    HENT: Negative.     Eyes: Negative.    Respiratory: Negative.     Cardiovascular: Negative.    Gastrointestinal: Negative.    Endocrine: Negative.    Genitourinary: Negative.  "   Musculoskeletal: Negative.    Integumentary:  Negative.   Allergic/Immunologic: Negative.    Neurological: Negative.    Psychiatric/Behavioral: Negative.         Objective:      Physical Exam  Vitals and nursing note reviewed.   Constitutional:       Appearance: Normal appearance.   HENT:      Head: Normocephalic.      Right Ear: Tympanic membrane, ear canal and external ear normal.      Left Ear: Tympanic membrane, ear canal and external ear normal.      Nose: Nose normal.      Mouth/Throat:      Mouth: Mucous membranes are moist.      Pharynx: Oropharynx is clear.   Eyes:      Conjunctiva/sclera: Conjunctivae normal.      Pupils: Pupils are equal, round, and reactive to light.   Cardiovascular:      Rate and Rhythm: Normal rate and regular rhythm.      Pulses: Normal pulses.      Heart sounds: Normal heart sounds.   Pulmonary:      Effort: Pulmonary effort is normal.      Breath sounds: Normal breath sounds.   Abdominal:      General: Bowel sounds are normal.      Palpations: Abdomen is soft.   Musculoskeletal:         General: Normal range of motion.      Cervical back: Normal range of motion and neck supple.   Skin:     General: Skin is warm and dry.      Capillary Refill: Capillary refill takes 2 to 3 seconds.   Neurological:      Mental Status: She is alert and oriented to person, place, and time.   Psychiatric:         Mood and Affect: Mood normal.         Behavior: Behavior normal.         Thought Content: Thought content normal.         Judgment: Judgment normal.       Assessment:       Problem List Items Addressed This Visit       Type 2 diabetes mellitus without complication, without long-term current use of insulin    Relevant Orders    Ambulatory referral/consult to Nutrition Services     Other Visit Diagnoses       Encounter for education    -  Primary    BMI 29.0-29.9,adult        Relevant Orders    Ambulatory referral/consult to Nutrition Services              Plan:           Jeannette TORIBIO was seen  today for follow-up.    Diagnoses and all orders for this visit:    Encounter for education  Type 2 diabetes mellitus without complication, without long-term current use of insulin  BMI 29.0-29.9,adult  -     Ambulatory referral/consult to Nutrition Services; Future  Continue current medications  Report to ER immediately if symptoms worsen or persist

## 2022-10-12 DIAGNOSIS — Z12.31 OTHER SCREENING MAMMOGRAM: ICD-10-CM

## 2022-10-26 ENCOUNTER — LAB VISIT (OUTPATIENT)
Dept: LAB | Facility: HOSPITAL | Age: 62
End: 2022-10-26
Attending: INTERNAL MEDICINE
Payer: COMMERCIAL

## 2022-10-26 DIAGNOSIS — E03.9 ACQUIRED HYPOTHYROIDISM: Chronic | ICD-10-CM

## 2022-10-26 PROCEDURE — 84443 ASSAY THYROID STIM HORMONE: CPT | Performed by: INTERNAL MEDICINE

## 2022-10-26 PROCEDURE — 84439 ASSAY OF FREE THYROXINE: CPT | Performed by: INTERNAL MEDICINE

## 2022-10-26 PROCEDURE — 84480 ASSAY TRIIODOTHYRONINE (T3): CPT | Performed by: INTERNAL MEDICINE

## 2022-10-26 PROCEDURE — 36415 COLL VENOUS BLD VENIPUNCTURE: CPT | Mod: PO | Performed by: INTERNAL MEDICINE

## 2022-10-27 LAB
T3 SERPL-MCNC: 74 NG/DL (ref 60–180)
T4 FREE SERPL-MCNC: 0.77 NG/DL (ref 0.71–1.51)
TSH SERPL DL<=0.005 MIU/L-ACNC: 3.55 UIU/ML (ref 0.4–4)

## 2022-10-27 NOTE — PROGRESS NOTES
Results have been released via [employer]. Please verify that these have been viewed by patient. If not, please call patient with results.    I have sent a message to them with the following interpretation (see below).    I have reviewed your recent results.    Thyroid studies now normal. Continue current dosages of medications.    Please do not hesitate to call or message with any additional questions or concerns.    Tejal Narayan MD

## 2023-01-05 LAB — BCS RECOMMENDATION EXT: NORMAL

## 2023-01-25 ENCOUNTER — PATIENT MESSAGE (OUTPATIENT)
Dept: ADMINISTRATIVE | Facility: HOSPITAL | Age: 63
End: 2023-01-25
Payer: COMMERCIAL

## 2023-05-09 ENCOUNTER — PATIENT MESSAGE (OUTPATIENT)
Dept: FAMILY MEDICINE | Facility: CLINIC | Age: 63
End: 2023-05-09
Payer: COMMERCIAL

## 2023-05-11 ENCOUNTER — TELEPHONE (OUTPATIENT)
Dept: FAMILY MEDICINE | Facility: CLINIC | Age: 63
End: 2023-05-11
Payer: COMMERCIAL

## 2023-05-11 DIAGNOSIS — E11.9 TYPE 2 DIABETES MELLITUS WITHOUT COMPLICATION, WITHOUT LONG-TERM CURRENT USE OF INSULIN: Primary | ICD-10-CM

## 2023-05-11 NOTE — TELEPHONE ENCOUNTER
----- Message from Lisha Barnes sent at 5/11/2023 10:43 AM CDT -----  Regarding: pt call bk  Name of Who is Calling:PINKY OSORIO [[id number]]        What is the request in detail: Pt states JARDIANCE 10 mg tablet is having her with side affects   Pt wants to be switched to another script   Please advise           Can the clinic reply by [facility]: yes         What Number to Call Back if not in [facility]: Telephone Information:  Mobile          [phone]

## 2023-05-11 NOTE — TELEPHONE ENCOUNTER
Pt states that she has stopped taking the Jardiance due to irritation in per perineum area. Pt asking if she could be switched to something else. Pt also asking if she is due for any lab work. Pls advise.

## 2023-05-11 NOTE — TELEPHONE ENCOUNTER
She can start on Januvia instead. She is due for an a1c and BMP. She can follow up with me 1-2 weeks after labs    I have signed for the following orders AND/OR meds.  Please call the patient and ask the patient to schedule the testing AND/OR inform about any medications that were sent. Medications have been sent to pharmacy listed below      Orders Placed This Encounter   Procedures    Basic Metabolic Panel     Standing Status:   Standing     Number of Occurrences:   99     Standing Expiration Date:   7/10/2042       Medications Ordered This Encounter   Medications    SITagliptin phosphate (JANUVIA) 25 MG Tab     Sig: Take 1 tablet (25 mg total) by mouth once daily.     Dispense:  90 tablet     Refill:  3         [facility] - [place], [place] - [address] [facility] [address]  [address]  [facility] [address]  Phone: [phone] Fax: [phone]    [facility] ([facility] - [place] [id number] [facility]  [address] [address]  Phone: [phone] Fax: [phone]    [facility] ) - [place], [place] - [address]  [address]  [address]  [facility] [address]  Phone: [phone] Fax: [phone]

## 2023-05-12 ENCOUNTER — LAB VISIT (OUTPATIENT)
Dept: LAB | Facility: HOSPITAL | Age: 63
End: 2023-05-12
Attending: INTERNAL MEDICINE
Payer: COMMERCIAL

## 2023-05-12 DIAGNOSIS — E11.9 TYPE 2 DIABETES MELLITUS WITHOUT COMPLICATION, WITHOUT LONG-TERM CURRENT USE OF INSULIN: ICD-10-CM

## 2023-05-12 LAB
ANION GAP SERPL CALC-SCNC: 8 MMOL/L (ref 8–16)
BUN SERPL-MCNC: 14 MG/DL (ref 8–23)
CALCIUM SERPL-MCNC: 9.8 MG/DL (ref 8.7–10.5)
CHLORIDE SERPL-SCNC: 102 MMOL/L (ref 95–110)
CO2 SERPL-SCNC: 27 MMOL/L (ref 23–29)
CREAT SERPL-MCNC: 0.8 MG/DL (ref 0.5–1.4)
EST. GFR  (NO RACE VARIABLE): >60 ML/MIN/1.73 M^2
ESTIMATED AVG GLUCOSE: 126 MG/DL (ref 68–131)
GLUCOSE SERPL-MCNC: 175 MG/DL (ref 70–110)
HBA1C MFR BLD: 6 % (ref 4–5.6)
POTASSIUM SERPL-SCNC: 4.3 MMOL/L (ref 3.5–5.1)
SODIUM SERPL-SCNC: 137 MMOL/L (ref 136–145)

## 2023-05-12 PROCEDURE — 80048 BASIC METABOLIC PNL TOTAL CA: CPT | Performed by: INTERNAL MEDICINE

## 2023-05-12 PROCEDURE — 36415 COLL VENOUS BLD VENIPUNCTURE: CPT | Mod: PO | Performed by: INTERNAL MEDICINE

## 2023-05-12 PROCEDURE — 83036 HEMOGLOBIN GLYCOSYLATED A1C: CPT | Performed by: INTERNAL MEDICINE

## 2023-06-05 ENCOUNTER — OFFICE VISIT (OUTPATIENT)
Dept: FAMILY MEDICINE | Facility: CLINIC | Age: 63
End: 2023-06-05
Payer: COMMERCIAL

## 2023-06-05 VITALS
BODY MASS INDEX: 29.17 KG/M2 | HEIGHT: 63 IN | SYSTOLIC BLOOD PRESSURE: 135 MMHG | TEMPERATURE: 98 F | HEART RATE: 100 BPM | DIASTOLIC BLOOD PRESSURE: 87 MMHG | WEIGHT: 164.63 LBS

## 2023-06-05 DIAGNOSIS — E03.9 ACQUIRED HYPOTHYROIDISM: Chronic | ICD-10-CM

## 2023-06-05 DIAGNOSIS — E11.9 TYPE 2 DIABETES MELLITUS WITHOUT COMPLICATION, WITHOUT LONG-TERM CURRENT USE OF INSULIN: Primary | ICD-10-CM

## 2023-06-05 DIAGNOSIS — E78.2 MIXED HYPERLIPIDEMIA: Chronic | ICD-10-CM

## 2023-06-05 PROCEDURE — 99999 PR PBB SHADOW E&M-EST. PATIENT-LVL III: CPT | Mod: PBBFAC,,, | Performed by: INTERNAL MEDICINE

## 2023-06-05 PROCEDURE — 99999 PR PBB SHADOW E&M-EST. PATIENT-LVL III: ICD-10-PCS | Mod: PBBFAC,,, | Performed by: INTERNAL MEDICINE

## 2023-06-05 PROCEDURE — 99214 PR OFFICE/OUTPT VISIT, EST, LEVL IV, 30-39 MIN: ICD-10-PCS | Mod: S$GLB,,, | Performed by: INTERNAL MEDICINE

## 2023-06-05 PROCEDURE — 99214 OFFICE O/P EST MOD 30 MIN: CPT | Mod: S$GLB,,, | Performed by: INTERNAL MEDICINE

## 2023-06-05 RX ORDER — LEVOTHYROXINE SODIUM 50 UG/1
50 TABLET ORAL
Qty: 90 TABLET | Refills: 3 | Status: SHIPPED | OUTPATIENT
Start: 2023-06-05 | End: 2024-06-04

## 2023-06-05 RX ORDER — LIOTHYRONINE SODIUM 5 UG/1
5 TABLET ORAL DAILY
Qty: 90 TABLET | Refills: 3 | Status: SHIPPED | OUTPATIENT
Start: 2023-06-05 | End: 2024-06-04

## 2023-06-05 RX ORDER — PIOGLITAZONEHYDROCHLORIDE 15 MG/1
15 TABLET ORAL DAILY
Qty: 90 TABLET | Refills: 3 | Status: SHIPPED | OUTPATIENT
Start: 2023-06-05 | End: 2024-06-04

## 2023-06-05 NOTE — PROGRESS NOTES
Assessment/Plan:    Problem List Items Addressed This Visit          Cardiac/Vascular    Hyperlipidemia (Chronic)    Overview     Hyperlipidemia Medications               atorvastatin (LIPITOR) 80 MG tablet TAKE 1 TABLET BY MOUTH ONCE DAILY   -chronic condition. Currently stable.    -reports compliance with hyperlipidemia treatment as prescribed  -denies any known adverse effects of medications  -most recent labs listed below:  Lab Results   Component Value Date    CHOL 230 (H) 08/26/2022     Lab Results   Component Value Date    HDL 59 08/26/2022     Lab Results   Component Value Date    LDLCALC 122.4 08/26/2022     Lab Results   Component Value Date    TRIG 243 (H) 08/26/2022     Lab Results   Component Value Date    ALT 32 08/26/2022    AST 25 08/26/2022    ALKPHOS 78 08/26/2022    BILITOT 0.8 08/26/2022             Endocrine    Acquired hypothyroidism (Chronic)    Overview     Lab Results   Component Value Date    TSH 3.552 10/26/2022    -currently on levothyroxine 50 mcg and cytomel 5 mcg QD         Relevant Medications    levothyroxine (SYNTHROID) 50 MCG tablet    liothyronine (CYTOMEL) 5 MCG Tab    Other Relevant Orders    TSH    T4, Free    CBC Without Differential    Type 2 diabetes mellitus without complication, without long-term current use of insulin - Primary    Overview     Diabetes Medications               SITagliptin phosphate (JANUVIA) 25 MG Tab Take 1 tablet (25 mg total) by mouth once daily.   -condition is currently controlled  -AE with Jardiance and metformin  -losing private insurance for 11 months so need to switch from Januvia due to cost. Will start actos 15 mg QD instead fo rnow   -see diabetic health maintenance listed below  -on statin: Yes  -on ACE-I/ARB: No  -counseling provided on importance of diabetic diet and medication compliance in order to treat diabetes  -discussed diabetes disease course and potential complications           Relevant Medications    pioglitazone (ACTOS) 15 MG  tablet    Other Relevant Orders    CBC Without Differential         Tejal Narayan, MD  _____________________________________________________________________________________________________________________________________________________    CC: follow up of chronic medical conditions     HPI:    Patient is in clinic today as an established patient.    DM2: Patient presents for follow up of diabetes. Condition is chronic and stable. Patient denies symptoms, including foot ulcerations, hyperglycemia, hypoglycemia , nausea, paresthesia of the feet, polydipsia, polyuria and visual disturbances.  Evaluation to date has been included: fasting blood sugar, fasting lipid panel, hemoglobin A1C and microalbuminuria. Denies adverse effects of current medications.     Diabetes Management Status    Statin: Taking  ACE/ARB: Not taking    Screening or Prevention Patient's value Goal Complete/Controlled?   HgA1C Testing and Control   Lab Results   Component Value Date    HGBA1C 6.0 (H) 05/12/2023      Annually/Less than 8% Yes   Lipid profile : 08/26/2022 Annually Yes   LDL control Lab Results   Component Value Date    LDLCALC 122.4 08/26/2022    Annually/Less than 100 mg/dl  No   Nephropathy screening Lab Results   Component Value Date    LABMICR 11.0 08/26/2022     Lab Results   Component Value Date    PROTEINUA Negative 08/20/2019    Annually Yes   Blood pressure BP Readings from Last 1 Encounters:   06/05/23 135/87    Less than 140/90 Yes   Dilated retinal exam : 04/12/2022 Annually Yes   Foot exam   Most Recent Foot Exam Date: Not Found Annually No       No other new complaints today.  Remaining chronic conditions have been reviewed and remain stable. Further detail as stated above.     HM reviewed. Plan for c-scope once medicare kicks in.     No recent changes to medical/surgical history.    Current Outpatient Medications on File Prior to Visit   Medication Sig Dispense Refill    atorvastatin (LIPITOR) 80 MG tablet TAKE 1  "TABLET BY MOUTH ONCE DAILY 90 tablet 3    [DISCONTINUED] levothyroxine (SYNTHROID) 50 MCG tablet Take 1 tablet (50 mcg total) by mouth before breakfast. 30 tablet 11    [DISCONTINUED] liothyronine (CYTOMEL) 5 MCG Tab Take 1 tablet (5 mcg total) by mouth once daily. 30 tablet 11    [DISCONTINUED] SITagliptin phosphate (JANUVIA) 25 MG Tab Take 1 tablet (25 mg total) by mouth once daily. 90 tablet 3    aspirin (ECOTRIN) 81 MG EC tablet Take 1 tablet (81 mg total) by mouth once daily. (Patient taking differently: Take 81 mg by mouth every evening. PER MD INSTRUCTIONS) 90 tablet 3     No current facility-administered medications on file prior to visit.       Review of Systems   Constitutional:  Negative for chills, diaphoresis, fatigue and fever.   HENT:  Negative for congestion, ear pain, postnasal drip, sinus pain and sore throat.    Eyes:  Negative for pain and redness.   Respiratory:  Negative for cough, chest tightness and shortness of breath.    Cardiovascular:  Negative for chest pain and leg swelling.   Gastrointestinal:  Negative for abdominal pain, constipation, diarrhea, nausea and vomiting.   Genitourinary:  Negative for dysuria and hematuria.   Musculoskeletal:  Negative for arthralgias and joint swelling.   Skin:  Negative for rash.   Neurological:  Negative for dizziness, syncope and headaches.   Psychiatric/Behavioral:  Negative for dysphoric mood. The patient is not nervous/anxious.      Vitals:    06/05/23 1410   BP: 135/87   BP Location: Right arm   Patient Position: Sitting   BP Method: Small (Automatic)   Pulse: 100   Temp: 98.1 °F (36.7 °C)   TempSrc: Temporal   Weight: 74.7 kg (164 lb 9.6 oz)   Height: 5' 3" (1.6 m)       Wt Readings from Last 3 Encounters:   06/05/23 74.7 kg (164 lb 9.6 oz)   09/06/22 74.4 kg (164 lb 0.4 oz)   03/31/22 76 kg (167 lb 8 oz)       Physical Exam  Constitutional:       General: She is not in acute distress.     Appearance: Normal appearance. She is well-developed. "   HENT:      Head: Normocephalic and atraumatic.   Eyes:      Conjunctiva/sclera: Conjunctivae normal.   Cardiovascular:      Rate and Rhythm: Normal rate and regular rhythm.      Pulses: Normal pulses.      Heart sounds: Normal heart sounds. No murmur heard.  Pulmonary:      Effort: Pulmonary effort is normal. No respiratory distress.      Breath sounds: Normal breath sounds.   Abdominal:      General: Bowel sounds are normal. There is no distension.      Palpations: Abdomen is soft.      Tenderness: There is no abdominal tenderness.   Musculoskeletal:         General: Normal range of motion.      Cervical back: Normal range of motion and neck supple.   Skin:     General: Skin is warm and dry.      Findings: No rash.   Neurological:      General: No focal deficit present.      Mental Status: She is alert and oriented to person, place, and time.   Psychiatric:         Mood and Affect: Mood normal.         Behavior: Behavior normal.     Health Maintenance   Topic Date Due    Foot Exam  Never done    TETANUS VACCINE  Never done    Eye Exam  04/12/2023    Lipid Panel  08/26/2023    Hemoglobin A1c  11/12/2023    Mammogram  01/05/2024    Hepatitis C Screening  Completed

## 2023-06-30 ENCOUNTER — LAB VISIT (OUTPATIENT)
Dept: LAB | Facility: HOSPITAL | Age: 63
End: 2023-06-30
Attending: INTERNAL MEDICINE
Payer: COMMERCIAL

## 2023-06-30 DIAGNOSIS — Z13.220 ENCOUNTER FOR LIPID SCREENING FOR CARDIOVASCULAR DISEASE: ICD-10-CM

## 2023-06-30 DIAGNOSIS — Z13.6 ENCOUNTER FOR LIPID SCREENING FOR CARDIOVASCULAR DISEASE: ICD-10-CM

## 2023-06-30 DIAGNOSIS — E03.9 ACQUIRED HYPOTHYROIDISM: Chronic | ICD-10-CM

## 2023-06-30 DIAGNOSIS — E11.9 TYPE 2 DIABETES MELLITUS WITHOUT COMPLICATION, WITHOUT LONG-TERM CURRENT USE OF INSULIN: ICD-10-CM

## 2023-06-30 LAB
ALBUMIN SERPL BCP-MCNC: 4.2 G/DL (ref 3.5–5.2)
ALP SERPL-CCNC: 62 U/L (ref 55–135)
ALT SERPL W/O P-5'-P-CCNC: 31 U/L (ref 10–44)
ANION GAP SERPL CALC-SCNC: 12 MMOL/L (ref 8–16)
AST SERPL-CCNC: 23 U/L (ref 10–40)
BILIRUB SERPL-MCNC: 0.4 MG/DL (ref 0.1–1)
BUN SERPL-MCNC: 14 MG/DL (ref 8–23)
CALCIUM SERPL-MCNC: 9.7 MG/DL (ref 8.7–10.5)
CHLORIDE SERPL-SCNC: 103 MMOL/L (ref 95–110)
CHOLEST SERPL-MCNC: 184 MG/DL (ref 120–199)
CHOLEST/HDLC SERPL: 3.3 {RATIO} (ref 2–5)
CO2 SERPL-SCNC: 26 MMOL/L (ref 23–29)
CREAT SERPL-MCNC: 0.8 MG/DL (ref 0.5–1.4)
ERYTHROCYTE [DISTWIDTH] IN BLOOD BY AUTOMATED COUNT: 12.3 % (ref 11.5–14.5)
EST. GFR  (NO RACE VARIABLE): >60 ML/MIN/1.73 M^2
GLUCOSE SERPL-MCNC: 128 MG/DL (ref 70–110)
HCT VFR BLD AUTO: 45.5 % (ref 37–48.5)
HDLC SERPL-MCNC: 56 MG/DL (ref 40–75)
HDLC SERPL: 30.4 % (ref 20–50)
HGB BLD-MCNC: 14.8 G/DL (ref 12–16)
LDLC SERPL CALC-MCNC: 100.6 MG/DL (ref 63–159)
MCH RBC QN AUTO: 32 PG (ref 27–31)
MCHC RBC AUTO-ENTMCNC: 32.5 G/DL (ref 32–36)
MCV RBC AUTO: 99 FL (ref 82–98)
NONHDLC SERPL-MCNC: 128 MG/DL
PLATELET # BLD AUTO: 317 K/UL (ref 150–450)
PMV BLD AUTO: 9.1 FL (ref 9.2–12.9)
POTASSIUM SERPL-SCNC: 4.1 MMOL/L (ref 3.5–5.1)
PROT SERPL-MCNC: 7.1 G/DL (ref 6–8.4)
RBC # BLD AUTO: 4.62 M/UL (ref 4–5.4)
SODIUM SERPL-SCNC: 141 MMOL/L (ref 136–145)
T4 FREE SERPL-MCNC: 0.88 NG/DL (ref 0.71–1.51)
TRIGL SERPL-MCNC: 137 MG/DL (ref 30–150)
TSH SERPL DL<=0.005 MIU/L-ACNC: 4.29 UIU/ML (ref 0.4–4)
WBC # BLD AUTO: 6.94 K/UL (ref 3.9–12.7)

## 2023-06-30 PROCEDURE — 80061 LIPID PANEL: CPT | Performed by: INTERNAL MEDICINE

## 2023-06-30 PROCEDURE — 36415 COLL VENOUS BLD VENIPUNCTURE: CPT | Mod: PO | Performed by: INTERNAL MEDICINE

## 2023-06-30 PROCEDURE — 85027 COMPLETE CBC AUTOMATED: CPT | Performed by: INTERNAL MEDICINE

## 2023-06-30 PROCEDURE — 80053 COMPREHEN METABOLIC PANEL: CPT | Performed by: INTERNAL MEDICINE

## 2023-06-30 PROCEDURE — 84443 ASSAY THYROID STIM HORMONE: CPT | Performed by: INTERNAL MEDICINE

## 2023-06-30 PROCEDURE — 84439 ASSAY OF FREE THYROXINE: CPT | Performed by: INTERNAL MEDICINE

## 2023-07-13 DIAGNOSIS — E78.5 HYPERLIPIDEMIA, UNSPECIFIED HYPERLIPIDEMIA TYPE: ICD-10-CM

## 2023-07-13 RX ORDER — ATORVASTATIN CALCIUM 80 MG/1
TABLET, FILM COATED ORAL
Qty: 90 TABLET | Refills: 3 | Status: SHIPPED | OUTPATIENT
Start: 2023-07-13

## 2023-07-13 NOTE — TELEPHONE ENCOUNTER
No care due was identified.  Health [facility] Embedded Care Due Messages. Reference number: [id number].   7/13/2023 1:57:25 AM CDT

## 2023-07-13 NOTE — TELEPHONE ENCOUNTER
Refill Decision Note   Jeannette Rahman  is requesting a refill authorization.  Brief Assessment and Rationale for Refill:  Approve     Medication Therapy Plan:         Comments:     No Care Gaps recommended.     Note composed:2:13 PM 07/13/2023

## 2023-11-29 DIAGNOSIS — E11.9 TYPE 2 DIABETES MELLITUS WITHOUT COMPLICATION: ICD-10-CM

## 2024-02-10 ENCOUNTER — PATIENT MESSAGE (OUTPATIENT)
Dept: ADMINISTRATIVE | Facility: HOSPITAL | Age: 64
End: 2024-02-10
Payer: COMMERCIAL

## 2024-02-10 DIAGNOSIS — E11.9 TYPE 2 DIABETES MELLITUS WITHOUT COMPLICATION, UNSPECIFIED WHETHER LONG TERM INSULIN USE: ICD-10-CM

## 2024-04-14 DIAGNOSIS — E11.9 TYPE 2 DIABETES MELLITUS WITHOUT COMPLICATION, WITHOUT LONG-TERM CURRENT USE OF INSULIN: ICD-10-CM

## 2024-04-14 DIAGNOSIS — E03.9 ACQUIRED HYPOTHYROIDISM: Chronic | ICD-10-CM

## 2024-04-14 RX ORDER — LIOTHYRONINE SODIUM 5 UG/1
5 TABLET ORAL
Qty: 90 TABLET | Refills: 0 | Status: SHIPPED | OUTPATIENT
Start: 2024-04-14

## 2024-04-14 RX ORDER — LEVOTHYROXINE SODIUM 50 UG/1
50 TABLET ORAL
Qty: 90 TABLET | Refills: 0 | Status: SHIPPED | OUTPATIENT
Start: 2024-04-14

## 2024-04-14 NOTE — TELEPHONE ENCOUNTER
Care Due:                  Date            Visit Type   Department     Provider  --------------------------------------------------------------------------------                                EP -                              PRIMARY      [facility] FAMILY  Last Visit: 06-      CARE ([facility])   MEDICINE       Tejal Narayan  Next Visit: None Scheduled  None         None Found                                                            Last  Test          Frequency    Reason                     Performed    Due Date  --------------------------------------------------------------------------------    CMP.........  12 months..  atorvastatin,              06- 06-                             pioglitazone.............    HBA1C.......  6 months...  pioglitazone.............  05- 11-    Lipid Panel.  12 months..  atorvastatin.............  06- 06-    TSH.........  12 months..  levothyroxine,             07- 06-                             liothyronine.............    [employer] Embedded Care Due Messages. Reference number: [id number].   4/14/2024 5:55:48 AM CDT

## 2024-04-15 RX ORDER — PIOGLITAZONEHYDROCHLORIDE 15 MG/1
15 TABLET ORAL
Qty: 90 TABLET | Refills: 0 | Status: SHIPPED | OUTPATIENT
Start: 2024-04-15

## 2024-04-15 NOTE — TELEPHONE ENCOUNTER
Refill Routing Note   Medication(s) are not appropriate for processing by [facility] for the following reason(s):      Required labs outdated    [facility] action(s):  Approve  Defer Care Due:  Labs due            Appointments  past 12m or future 3m with PCP    Date Provider   Last Visit   6/5/2023 Tejal Narayan MD   Next Visit   Visit date not found Tejal Narayan MD   ED visits in past 90 days: 0        Note composed:7:34 PM 04/14/2024

## 2024-06-05 DIAGNOSIS — Z78.0 MENOPAUSE: ICD-10-CM

## 2024-06-10 ENCOUNTER — TELEPHONE (OUTPATIENT)
Dept: FAMILY MEDICINE | Facility: CLINIC | Age: 64
End: 2024-06-10
Payer: MEDICARE

## 2024-06-10 DIAGNOSIS — Z12.31 ENCOUNTER FOR SCREENING MAMMOGRAM FOR BREAST CANCER: Primary | ICD-10-CM

## 2024-06-10 NOTE — TELEPHONE ENCOUNTER
----- Message from Kodak Mercado sent at 6/10/2024  7:48 AM CDT -----  Good morning,    Ms. Rahman would like to schedule her mammogram but the orders have .   Can you please put in a new order to be scheduled?    Thank you,  [place]  Radiology Dept.

## 2024-06-10 NOTE — TELEPHONE ENCOUNTER
----- Message from Kodak Mercado sent at 6/10/2024  7:48 AM CDT -----  Good morning,    Ms. Rahman would like to schedule her mammogram but the orders have .   Can you please put in a new order to be scheduled?    Thank you,  [place]  Radiology Dept.

## 2024-06-11 ENCOUNTER — HOSPITAL ENCOUNTER (OUTPATIENT)
Dept: RADIOLOGY | Facility: HOSPITAL | Age: 64
Discharge: HOME | End: 2024-06-11
Attending: INTERNAL MEDICINE
Payer: MEDICARE

## 2024-06-11 DIAGNOSIS — Z12.31 ENCOUNTER FOR SCREENING MAMMOGRAM FOR BREAST CANCER: ICD-10-CM

## 2024-06-11 PROCEDURE — 77067 SCR MAMMO BI INCL CAD: CPT | Mod: TC,PO

## 2024-06-14 DIAGNOSIS — E78.5 HYPERLIPIDEMIA, UNSPECIFIED HYPERLIPIDEMIA TYPE: ICD-10-CM

## 2024-06-15 RX ORDER — ATORVASTATIN CALCIUM 80 MG/1
TABLET, FILM COATED ORAL
Qty: 90 TABLET | Refills: 0 | Status: SHIPPED | OUTPATIENT
Start: 2024-06-15

## 2024-06-15 NOTE — TELEPHONE ENCOUNTER
Provider Staff:  Action required for this patient    Requires labs      Please see care gap opportunities below in Care Due Message.    Thanks!  [facility]     Appointments      Date Provider   Last Visit   6/5/2023 Tejal Narayan MD   Next Visit   Visit date not found Tejal Narayan MD     Refill Decision Note   Jeannette Rahman  is requesting a refill authorization.  Brief Assessment and Rationale for Refill:  Approve     Medication Therapy Plan:        Comments:     Note composed:2:01 AM 06/15/2024

## 2024-06-15 NOTE — TELEPHONE ENCOUNTER
Care Due:                  Date            Visit Type   Department     Provider  --------------------------------------------------------------------------------                                EP -                              PRIMARY      [facility] FAMILY  Last Visit: 06-      CARE ([facility])   MEDICINE       Tejal Narayan  Next Visit: None Scheduled  None         None Found                                                            Last  Test          Frequency    Reason                     Performed    Due Date  --------------------------------------------------------------------------------    Office Visit  15 months..  atorvastatin,              06- 08-                             levothyroxine,                             liothyronine.............    CMP.........  12 months..  atorvastatin.............  06- 06-    Lipid Panel.  12 months..  atorvastatin.............  06- 06-    TSH.........  12 months..  levothyroxine,             07- 06-                             liothyronine.............    [employer] Embedded Care Due Messages. Reference number: [id number].   6/14/2024 9:40:36 PM CDT

## 2024-06-20 NOTE — PROGRESS NOTES
Normal mammogram, repeat in 1 year, results released through [employer]. Please verify that patient has viewed results. If not, please call patient with interpretation below:     I have reviewed the results of your mammogram and it appears that everything was read as normal.  Based on this, the radiologist has recommended that you recheck a mammogram in 1 year.

## 2024-06-27 ENCOUNTER — HOSPITAL ENCOUNTER (OUTPATIENT)
Dept: RADIOLOGY | Facility: HOSPITAL | Age: 64
Discharge: HOME | End: 2024-06-27
Attending: INTERNAL MEDICINE
Payer: MEDICARE

## 2024-06-27 DIAGNOSIS — Z78.0 MENOPAUSE: ICD-10-CM

## 2024-06-27 PROCEDURE — 77080 DXA BONE DENSITY AXIAL: CPT | Mod: TC,PO

## 2024-06-27 PROCEDURE — 77080 DXA BONE DENSITY AXIAL: CPT | Mod: 26,,, | Performed by: RADIOLOGY

## 2024-06-28 NOTE — PROGRESS NOTES
Patient's DEXA scan results have been sent via portal. Please verify that patient has viewed results. If not, please call patient with interpretation below:    -Your recent DEXA scan shows osteopenia.    -I recommend that you start using weight bearing exercises to help reduce the risk of a fracture.    -Also, please start taking over the counter calcium 1200 mg daily and Vitamin D3 1000 units daily.  -We will plan to recheck your DEXA scan in 2 years.      Also please see below health maintenance items that are due:    Pneumococcal Vaccines (Age 65+)(1 of 2 - PCV) Never done  Foot Exam Never done  HIV Screening Never done  TETANUS VACCINE Never done  Shingles Vaccine(1 of 2) Never done  Colorectal Cancer Screening due on 12/17/2018  RSV Vaccine (Age 60+ and Pregnant patients)(1 - 1-dose 60+ series) Never done  Eye Exam due on 04/12/2023  Diabetes Urine Screening due on 08/26/2023  COVID-19 Vaccine(3 - 2023-24 season) due on 09/01/2023  Hemoglobin A1c due on 11/12/2023  Lipid Panel due on 06/30/2024

## 2024-07-09 DIAGNOSIS — E03.9 ACQUIRED HYPOTHYROIDISM: Chronic | ICD-10-CM

## 2024-07-09 RX ORDER — LIOTHYRONINE SODIUM 5 UG/1
5 TABLET ORAL
Qty: 90 TABLET | Refills: 0 | Status: SHIPPED | OUTPATIENT
Start: 2024-07-09

## 2024-07-09 RX ORDER — LEVOTHYROXINE SODIUM 50 UG/1
50 TABLET ORAL
Qty: 90 TABLET | Refills: 0 | Status: SHIPPED | OUTPATIENT
Start: 2024-07-09

## 2024-07-09 NOTE — TELEPHONE ENCOUNTER
No care due was identified.  [facility] Embedded Care Due Messages. Reference number: [id number].   7/09/2024 3:58:58 AM CDT

## 2024-07-09 NOTE — TELEPHONE ENCOUNTER
Refill Routing Note   Medication(s) are not appropriate for processing by [facility] for the following reason(s):        Required labs abnormal    [facility] action(s):  Defer             Appointments  past 12m or future 3m with PCP    Date Provider   Last Visit   6/5/2023 Tejal Narayan MD   Next Visit   8/29/2024 Tejal Narayan MD   ED visits in past 90 days: 0        Note composed:12:30 PM 07/09/2024

## 2024-08-09 DIAGNOSIS — E78.5 HYPERLIPIDEMIA, UNSPECIFIED HYPERLIPIDEMIA TYPE: ICD-10-CM

## 2024-08-10 NOTE — TELEPHONE ENCOUNTER
Refill Routing Note   Medication(s) are not appropriate for processing by [facility] for the following reason(s):        Required labs outdated: CMP, Lipid panel    [facility] action(s):  Defer     Requires labs : Yes    Medication Therapy Plan:         Appointments  past 12m or future 3m with PCP    Date Provider   Last Visit   6/5/2023 Tejal Narayan MD   Next Visit   8/29/2024 Tejal Narayan MD   ED visits in past 90 days: 0        Note composed:10:41 AM 08/10/2024

## 2024-08-10 NOTE — TELEPHONE ENCOUNTER
No care due was identified.  [facility] Embedded Care Due Messages. Reference number: [id number].   8/09/2024 9:27:59 PM CDT

## 2024-08-12 RX ORDER — ATORVASTATIN CALCIUM 80 MG/1
80 TABLET, FILM COATED ORAL DAILY
Qty: 90 TABLET | Refills: 0 | Status: SHIPPED | OUTPATIENT
Start: 2024-08-12

## 2024-08-19 ENCOUNTER — PATIENT OUTREACH (OUTPATIENT)
Dept: ADMINISTRATIVE | Facility: HOSPITAL | Age: 64
End: 2024-08-19
Payer: MEDICARE

## 2024-08-19 NOTE — PROGRESS NOTES
DM LABS: per chart review pt is overdue for Ha1c/CMP/Lipid/Microalbumin, all linked to lab appt 8.21.24.

## 2024-08-21 ENCOUNTER — LAB VISIT (OUTPATIENT)
Dept: LAB | Facility: HOSPITAL | Age: 64
End: 2024-08-21
Attending: INTERNAL MEDICINE
Payer: MEDICARE

## 2024-08-21 DIAGNOSIS — E11.9 TYPE 2 DIABETES MELLITUS WITHOUT COMPLICATION: ICD-10-CM

## 2024-08-21 LAB
ALBUMIN/CREAT UR: 5.8 UG/MG (ref 0–30)
CREAT UR-MCNC: 138 MG/DL (ref 15–325)
MICROALBUMIN UR DL<=1MG/L-MCNC: 8 UG/ML

## 2024-08-21 PROCEDURE — 82570 ASSAY OF URINE CREATININE: CPT | Performed by: INTERNAL MEDICINE

## 2024-08-21 PROCEDURE — 82043 UR ALBUMIN QUANTITATIVE: CPT | Performed by: INTERNAL MEDICINE

## 2024-08-29 ENCOUNTER — LAB VISIT (OUTPATIENT)
Dept: LAB | Facility: HOSPITAL | Age: 64
End: 2024-08-29
Attending: INTERNAL MEDICINE
Payer: MEDICARE

## 2024-08-29 ENCOUNTER — OFFICE VISIT (OUTPATIENT)
Dept: FAMILY MEDICINE | Facility: CLINIC | Age: 64
End: 2024-08-29
Payer: MEDICARE

## 2024-08-29 VITALS
HEART RATE: 82 BPM | HEIGHT: 63 IN | DIASTOLIC BLOOD PRESSURE: 83 MMHG | SYSTOLIC BLOOD PRESSURE: 139 MMHG | WEIGHT: 167 LBS | BODY MASS INDEX: 29.59 KG/M2 | TEMPERATURE: 98 F

## 2024-08-29 DIAGNOSIS — E03.9 ACQUIRED HYPOTHYROIDISM: Primary | Chronic | ICD-10-CM

## 2024-08-29 DIAGNOSIS — E78.2 MIXED HYPERLIPIDEMIA: Chronic | ICD-10-CM

## 2024-08-29 DIAGNOSIS — E11.9 TYPE 2 DIABETES MELLITUS WITHOUT COMPLICATION, WITHOUT LONG-TERM CURRENT USE OF INSULIN: ICD-10-CM

## 2024-08-29 DIAGNOSIS — E04.1 THYROID NODULE: ICD-10-CM

## 2024-08-29 DIAGNOSIS — I65.22 LEFT CAROTID STENOSIS: ICD-10-CM

## 2024-08-29 DIAGNOSIS — E03.9 ACQUIRED HYPOTHYROIDISM: Chronic | ICD-10-CM

## 2024-08-29 DIAGNOSIS — I73.9 PAD (PERIPHERAL ARTERY DISEASE): ICD-10-CM

## 2024-08-29 LAB
T4 FREE SERPL-MCNC: 0.77 NG/DL (ref 0.71–1.51)
TSH SERPL DL<=0.005 MIU/L-ACNC: 3.84 UIU/ML (ref 0.4–4)

## 2024-08-29 PROCEDURE — 99215 OFFICE O/P EST HI 40 MIN: CPT | Mod: PBBFAC,PO | Performed by: INTERNAL MEDICINE

## 2024-08-29 PROCEDURE — G2211 COMPLEX E/M VISIT ADD ON: HCPCS | Mod: S$PBB,,, | Performed by: INTERNAL MEDICINE

## 2024-08-29 PROCEDURE — 84443 ASSAY THYROID STIM HORMONE: CPT | Performed by: INTERNAL MEDICINE

## 2024-08-29 PROCEDURE — 84439 ASSAY OF FREE THYROXINE: CPT | Performed by: INTERNAL MEDICINE

## 2024-08-29 PROCEDURE — 99999 PR PBB SHADOW E&M-EST. PATIENT-LVL V: CPT | Mod: PBBFAC,,, | Performed by: INTERNAL MEDICINE

## 2024-08-29 PROCEDURE — 99214 OFFICE O/P EST MOD 30 MIN: CPT | Mod: S$PBB,,, | Performed by: INTERNAL MEDICINE

## 2024-08-29 PROCEDURE — 36415 COLL VENOUS BLD VENIPUNCTURE: CPT | Mod: PO | Performed by: INTERNAL MEDICINE

## 2024-08-29 RX ORDER — TIRZEPATIDE 2.5 MG/.5ML
2.5 INJECTION, SOLUTION SUBCUTANEOUS
Qty: 2 ML | Refills: 5 | Status: SHIPPED | OUTPATIENT
Start: 2024-08-29

## 2024-08-29 NOTE — PATIENT INSTRUCTIONS
-start weekly diabetes injection. Stop actos once you start this. Use 2.5 mg dose for at least one month. If you do not see any weight change, let me know and we will increase dose  -message with colonoscopy doctor when you find out name  -recommend  shingles (Shingrx) and tetanus (Tdap) vaccines at the pharmacy.

## 2024-08-29 NOTE — PROGRESS NOTES
Assessment/Plan:    Problem List Items Addressed This Visit          Cardiac/Vascular    Hyperlipidemia (Chronic)    Overview     Hyperlipidemia Medications               atorvastatin (LIPITOR) 80 MG tablet TAKE 1 TABLET BY MOUTH ONCE DAILY     -chronic condition. Currently stable.    -reports compliance with hyperlipidemia treatment as prescribed  -denies any known adverse effects of medications  -most recent labs listed below:  Lab Results   Component Value Date    CHOL 162 08/21/2024     Lab Results   Component Value Date    HDL 58 08/21/2024     Lab Results   Component Value Date    LDLCALC 81.8 08/21/2024     Lab Results   Component Value Date    TRIG 111 08/21/2024     Lab Results   Component Value Date    ALT 23 08/21/2024    AST 21 08/21/2024    ALKPHOS 76 08/21/2024    BILITOT 0.7 08/21/2024            PAD (peripheral artery disease)    Overview     -s/p L carotid endarterectomy 2019  -remains on ASA/statin         Relevant Orders    US Carotid Bilateral    Left carotid stenosis    Relevant Orders    US Carotid Bilateral       Endocrine    Acquired hypothyroidism - Primary (Chronic)    Overview     Lab Results   Component Value Date    TSH 4.290 (H) 06/30/2023   -normal T4   -currently on levothyroxine 50 mcg and cytomel 5 mcg QD  -due for updated labs         Relevant Orders    TSH    T4, Free    Type 2 diabetes mellitus without complication, without long-term current use of insulin    Overview     Diabetes Medications               pioglitazone (ACTOS) 15 MG tablet TAKE 1 TABLET BY MOUTH ONCE  DAILY     -condition is currently controlled  -on actos but requesting to switch to medication to help weight loss; plan to start Mounjaro  -AE with Jardiance and metformin  -see diabetic health maintenance listed below  -on statin: Yes  -on ACE-I/ARB: No  -counseling provided on importance of diabetic diet and medication compliance in order to treat diabetes  -discussed diabetes disease course and potential  complications  -Discussed risks and benefits of GLP1a therapy.  Reviewed patient's family history and personal history for thyroid C cell type tumor and MEN syndrome.  Patient denies a history of these disorders or syndromes.  Patient was aware of increased risk of pancreatitis and worsening of these conditions in  animal studies.  Also discussed side effects of nausea vomiting diarrhea and abdominal pain.  Patient should notify me immediately if having any of these symptoms.  ER precautions were given.         Relevant Medications    tirzepatide (MOUNJARO) 2.5 mg/0.5 mL PnIj    Thyroid nodule    Overview     -US in 2021- small nodule  -hx of hypothyroidism  -possible nodule palpated on exam  -plan to repeat thyroid US         Relevant Orders    US Thyroid   Visit today included increased complexity associated with the care of the episodic problem(s) addressed and managing the longitudinal care of the patient due to the serious and/or complex managed problem(s). See above assessment/plan.    Follow up for labs today: tsh,t4; US thyroid and carotid; a1c in 3 mo.    Tejal Narayan MD  _____________________________________________________________________________________________________________________________________________________    CC: follow up of chronic medical conditions     Patient is in clinic today as an established patient.    History of Present Illness  The patient presents for evaluation of multiple medical concerns.    She reports a general sense of well-being. Her diabetes medication was switched from Januvia to Actos last year due to cost issues, which she tolerates well. She has since gotten on Medicare and expresses interest in a medication that could assist with weight loss. She has previously tried Jardiance and Metformin but experienced intolerance.     She experiences intermittent foot pain, which she manages with magnesium supplements and finds them beneficial.     She suffers from  constipation, which she manages with Colace, taken every other day. She found relief from drinking lemon water in the morning but has since discontinued this practice.    She underwent a carotid artery ultrasound in 2021 and continues to take baby aspirin and statin.     No other new complaints today.  Remaining chronic conditions have been reviewed and remain stable. Further detail as stated above.    Health Maintenance reviewed - patient asked to schedule diabetic eye exam. Discussed recommended routine vaccinations.     No recent changes to medical/surgical history.    Current Outpatient Medications on File Prior to Visit   Medication Sig Dispense Refill    aspirin (ECOTRIN) 81 MG EC tablet Take 1 tablet (81 mg total) by mouth once daily. (Patient taking differently: Take 81 mg by mouth every evening. PER MD INSTRUCTIONS) 90 tablet 3    atorvastatin (LIPITOR) 80 MG tablet Take 1 tablet (80 mg total) by mouth once daily. 90 tablet 0    levothyroxine (SYNTHROID) 50 MCG tablet TAKE 1 TABLET BY MOUTH BEFORE  BREAKFAST 90 tablet 0    liothyronine (CYTOMEL) 5 MCG Tab TAKE 1 TABLET BY MOUTH ONCE  DAILY 90 tablet 0    pioglitazone (ACTOS) 15 MG tablet TAKE 1 TABLET BY MOUTH ONCE  DAILY 90 tablet 0     No current facility-administered medications on file prior to visit.       Review of Systems   Constitutional:  Negative for activity change, chills, diaphoresis, fatigue, fever and unexpected weight change.   HENT:  Negative for congestion, ear pain, hearing loss, postnasal drip, rhinorrhea, sinus pain, sore throat and trouble swallowing.    Eyes:  Negative for pain, discharge, redness and visual disturbance.   Respiratory:  Negative for cough, chest tightness, shortness of breath and wheezing.    Cardiovascular:  Negative for chest pain, palpitations and leg swelling.   Gastrointestinal:  Negative for abdominal pain, blood in stool, constipation, diarrhea, nausea and vomiting.   Endocrine: Negative for polydipsia and  "polyuria.   Genitourinary:  Negative for difficulty urinating, dysuria, hematuria and menstrual problem.   Musculoskeletal:  Negative for arthralgias, joint swelling and neck pain.   Skin:  Negative for rash.   Neurological:  Negative for dizziness, syncope, weakness and headaches.   Psychiatric/Behavioral:  Negative for confusion and dysphoric mood. The patient is not nervous/anxious.        Vitals:    08/29/24 0946 08/29/24 0949   BP: (!) 178/76 139/83   Pulse: 68 82   Temp: 98.4 °F (36.9 °C)    Weight: 75.8 kg (167 lb)    Height: 5' 3" (1.6 m)        Wt Readings from Last 3 Encounters:   08/29/24 75.8 kg (167 lb)   06/05/23 74.7 kg (164 lb 9.6 oz)   09/06/22 74.4 kg (164 lb 0.4 oz)       Physical Exam  Constitutional:       General: She is not in acute distress.     Appearance: Normal appearance. She is well-developed.   HENT:      Head: Normocephalic and atraumatic.   Eyes:      Conjunctiva/sclera: Conjunctivae normal.   Cardiovascular:      Rate and Rhythm: Normal rate and regular rhythm.      Pulses: Normal pulses.      Heart sounds: Normal heart sounds. No murmur heard.  Pulmonary:      Effort: Pulmonary effort is normal. No respiratory distress.      Breath sounds: Normal breath sounds.   Abdominal:      General: Bowel sounds are normal. There is no distension.      Palpations: Abdomen is soft.      Tenderness: There is no abdominal tenderness.   Musculoskeletal:         General: Normal range of motion.      Cervical back: Normal range of motion and neck supple.   Skin:     General: Skin is warm and dry.      Findings: No rash.   Neurological:      General: No focal deficit present.      Mental Status: She is alert and oriented to person, place, and time.   Psychiatric:         Mood and Affect: Mood normal.         Behavior: Behavior normal.         Protective Sensation (w/ 10 gram monofilament):  Right: Intact  Left: Intact    Visual Inspection:  Normal -  Bilateral    Pedal Pulses:   Right: Present  Left: " Present    Posterior Tibialis Pulses:   Right:Present  Left: Present    DISCLAIMER: This note was compiled by using a speech recognition dictation system and therefore please be aware that typographical / speech recognition errors can and do occur.  Please contact me if you see any errors specifically.  Consent was obtained for [employer] recording system prior to the visit.

## 2024-08-30 ENCOUNTER — PATIENT MESSAGE (OUTPATIENT)
Dept: FAMILY MEDICINE | Facility: CLINIC | Age: 64
End: 2024-08-30
Payer: MEDICARE

## 2024-08-30 DIAGNOSIS — E11.9 TYPE 2 DIABETES MELLITUS WITHOUT COMPLICATION, WITHOUT LONG-TERM CURRENT USE OF INSULIN: ICD-10-CM

## 2024-08-30 NOTE — TELEPHONE ENCOUNTER
[facility] PT NOTE    Treatment session           Pt seen on 10S nursing unit.                                                Frequency Comments: M-F                                                                                                                Admitting complaint: Degenerative joint disease of right hip [M16.11]                                          Precautions  Hip Precautions: Posterior precautions (12/09/18 0815)  Weight Bearing Status: Weight bearing as tolerated right lower extremity (12/09/18 0815)  Other Precautions: WBAT R LE s/p R total hip (12/09/18 0815)  Precautions Comments: R hip posterior precautions (12/09/18 0815)    ASSESSMENT:       Patient's ambulation tolerance is 150 + 150 feet  patient Verbalizes understanding and Needs reinforcement of precautions during completion of all functional mobility  ability to follow prescribed weight bearing status while completing functional mobility is good  Patient presents to physical therapy on POD 6 s/p  right THR.  Patient is demonstrating decreased range of motion, decreased strength, balance deficits, diminished safety awareness, decreased activity tolerance, postural problems, decreased endurance, inability to maintain weight bearing status, inability to maintain precautions which is limiting the completion of all functional mobility.  Further skilled physical therapy is required to address these limitations in attempt to maximize the patient's independence.  Patient continues to progress well with PT at this time. Improved ability to complete transfers, initiated stoop step training and crutch training for 2 stairs inside of home. Patient is limited more by cardiac status (HR up to 120's) and dyspnea with mobility. At this time, patient is requiring grossly supervision for transfers, gait and stoop step with light assist required  No care due was identified.  [facility] Embedded Care Due Messages. Reference number: [id number].   8/30/2024 12:50:09 PM CDT   for two stairs with crutches. Home with home PT recommended with assist able from spouse as needed. Continue PT efforts at this time.               Recommendation for Discharge: PT: Home, Home therapy        Recommendations for Discharge: OT: Post acute therapy             PT/OT Mobility Equipment for Discharge: anticipate no needs, pt owns walker, crutches and cane (12/12/18 1619)  PT/OT ADL Equipment for Discharge: continue to assess, but no needs anticipated - pt owns reacher, sock aid, commode, shower chair (12/12/18 1350)       Co-morbidities:   Patient Active Problem List   Diagnosis   • DJD left hip   • Left hip pain   • Left KEM 12-06-10   • HTN   • Coronary atherosclerosis of unspecified type of vessel, native or graft   • Dyslipidemia   • JOAN (obstructive sleep apnea)   • Diabetes mellitus, type 2 (CMS/HCC)   • Morbid obesity (CMS/HCC)   • Onychomycosis   • Pain in both feet   • Osteoarthritis of right hip   • Sensory polyneuropathy   • DJD R hip       Clinical presentation: stable and/or uncomplicated characteristics    SUBJECTIVE: Patient's Personal Goal: return home (12/12/18 1619)  Subjective: pt agreeable to session.  (12/12/18 1619)  Subjective/Objective Comments: JOSSIE Hamm aware of session, patient on commode following session with CNA in room (12/12/18 1619)    OBJECTIVE:  Basic Lines: Capped IV;Telemetry (12/12/18 1619)  Safety Measures: Other (comment)(call light near) (12/12/18 1619)    Assistance needed when returning home:   Discussed with patient/caregiver who acknowledged understanding of current recommendations for safe home discharge plan. Based on current level of assistance, recommendations are: home with home PT. Help to be provided by spouse as needed.      EDUCATION:   On this date, the patient and patient's spouse was educated on role of PT, POC.    The response to education was: Verbalizes understanding and Needs reinforcement    PT Identified Barriers to Discharge: below baseline  in mobility, stairs     PLAN:   Continue skilled PT, including the following Treatment/Interventions: Functional transfer training;Strengthening;Bed mobility;Gait training;Equipment eval/education;Patient/Family training;Endurance training;Stairs retraining;Safety Education;Neuromuscular re-education (12/12/18 1619)   Frequency Comments: M-F  (12/12/18 1619)    Treatment Plan for Next Session: Progression of gait with stairs(crutches), walker on stoop step, review HEP, family education and safety  Additional Plan Considerations: Posterior precautions, bring stoop step to session          Last 24 hours of Functional Data    CPM       Bed Mobility   Bed Mobility  Bed Mobility Comments: Not assessed at this time (12/12/18 1619)    Transfers  Transfers  Sit to Stand: Modified Independent (12/12/18 1619)  Stand to Sit: Modified Independent (12/12/18 1619)  Stand Pivot Transfers: Modified Independent (12/12/18 1619)  Assistive Device/: 2-wheeled walker;1 Person;Gait Belt (12/12/18 1619)  Transfer Comments 1: Completed with no assist and good ability to complete anterior weight shifting at this time (12/12/18 1619)      Gait  Gait  Gait Assistance: Supervision (Supv) (12/12/18 1619)  Assistive Device/: 2-wheeled walker;1 Person;Gait Belt (12/12/18 1619)  Ambulation Distance (Feet): 150 Feet (12/12/18 1619)  Pattern: Shuffle;Decreased stance time R (12/12/18 1619)  Ambulation Surface: Tile;Carpet (12/12/18 1619)  Gait Comments 1: Supervision with step through pattern, increased overall shortness of breath and sitting break required following session (12/12/18 1619)  Second Trial: Yes (12/12/18 1619), Gait - Second Trial  Gait Assistance: Supervision (Supv) (12/12/18 1619)  Assistive Device/: 2-wheeled walker;1 Person;Gait Belt (12/12/18 1619)  Ambulation Distance (Feet): 150 Feet (12/12/18 1619)  Pattern: Shuffle;Decreased stance time R (12/12/18 1619)  Ambulation Surface: Tile;Carpet  (12/12/18 1619)  Gait Comments 1: Sitting rest break between trials required due to increased heart rate and shortness of breath (12/12/18 1619)    Stairs  Stairs Mobility  Number of Stairs: 2(stoop steps) (12/12/18 1619)  Stair Management Assistance: Supervision (Supv) (12/12/18 1619)  Stair Management Technique: With walker;Forwards;Step to pattern;With gait belt (12/12/18 1619)  Stairs Mobility Comments: Good sequencing noted. Supervision for balance and safety at this time (12/12/18 1619)  Second Trial: Yes (12/12/18 1619)    Wheelchair Mobility       Balance  Balance  Sitting - Static: Independent (12/12/18 1619)  Sitting - Dynamic: Independent (12/12/18 1619)  Standing - Static: Modified Independent (12/12/18 1619)  Standing - Dynamic (eyes open): Supervision (Supv) (12/12/18 1619)  Balance Comments #1: 2ww for balance and safety (12/12/18 1619)      Therapy Goals:    Goals  Short Term Goals to Be Reviewed On: 12/18/18 (12/11/18 1534)  Short Term Goals = Discharge Goals: Yes (12/11/18 1534)  Goal Agreement: Patient agrees with goals and treatment plan (12/08/18 0830)  Bed Mobility Short Term Goal: (MET) Pt performs all bed mobility mod (A) (12/09/18 1129)  Bed Mobility Discharge Goal: Modified independent with exiting bed (12/11/18 1534)  Bed Mobility Discharge Goal Progress: Outcome not met, continue to monitor (12/10/18 1600)  Transfer Short Term Goal: (MET) Pt performs transfers with WW min (A) (12/09/18 1129)  Transfer Discharge Goal: Modified independent with walker (12/11/18 1534)  Transfer Discharge Goal Progress: Outcome not met, continue to monitor (12/10/18 1600)  Ambulation Short Term Goal: (MET) Pt ambulates 15' with WW total (A) (12/09/18 1129)  Ambulation Discharge Goal: Modified independent >100' with walker (12/11/18 1534)  Ambulation Discharge Goal Progress: Outcome not met, continue to monitor (12/10/18 1600)  Stairs Short Term Goal: GOAL MET (12/11/18 7098)  Stairs Discharge Goal: Complete 2  stoop steps + one normal step with minimal assist from spouse and appropriate device (12/11/18 1534)  Stairs Discharge Goal Progress: Outcome not met, continue to monitor (12/10/18 1600)  Therapeutic Exercise Discharge Goal: Independent with HEP (12/11/18 1534)  Goals Comments: Goals adjusted for plan to dc home (12/11/18 1534)        PT Time Spent: 47 minutes (12/12/18 2847)    See PT flowsheet for full details regarding the PT therapy provided.

## 2024-08-31 RX ORDER — PIOGLITAZONEHYDROCHLORIDE 15 MG/1
15 TABLET ORAL DAILY
Qty: 90 TABLET | Refills: 1 | Status: SHIPPED | OUTPATIENT
Start: 2024-08-31

## 2024-08-31 NOTE — TELEPHONE ENCOUNTER
Refill Decision Note   Jeannette Rahman  is requesting a refill authorization.  Brief Assessment and Rationale for Refill:  Approve     Medication Therapy Plan:        Comments:     Note composed:7:01 AM 08/31/2024

## 2024-09-04 ENCOUNTER — HOSPITAL ENCOUNTER (OUTPATIENT)
Dept: RADIOLOGY | Facility: HOSPITAL | Age: 64
Discharge: HOME | End: 2024-09-04
Attending: INTERNAL MEDICINE
Payer: MEDICARE

## 2024-09-04 DIAGNOSIS — I73.9 PAD (PERIPHERAL ARTERY DISEASE): ICD-10-CM

## 2024-09-04 DIAGNOSIS — E04.1 THYROID NODULE: ICD-10-CM

## 2024-09-04 DIAGNOSIS — I65.22 LEFT CAROTID STENOSIS: ICD-10-CM

## 2024-09-04 PROCEDURE — 76536 US EXAM OF HEAD AND NECK: CPT | Mod: 26,,, | Performed by: RADIOLOGY

## 2024-09-04 PROCEDURE — 93880 EXTRACRANIAL BILAT STUDY: CPT | Mod: TC,PO

## 2024-09-04 PROCEDURE — 76536 US EXAM OF HEAD AND NECK: CPT | Mod: TC,PO

## 2024-09-05 DIAGNOSIS — E11.9 TYPE 2 DIABETES MELLITUS WITHOUT COMPLICATION, WITHOUT LONG-TERM CURRENT USE OF INSULIN: ICD-10-CM

## 2024-09-05 RX ORDER — TIRZEPATIDE 2.5 MG/.5ML
2.5 INJECTION, SOLUTION SUBCUTANEOUS
Qty: 2 ML | Refills: 5 | Status: SHIPPED | OUTPATIENT
Start: 2024-09-05

## 2024-09-05 NOTE — TELEPHONE ENCOUNTER
No care due was identified.  Health [facility] Embedded Care Due Messages. Reference number: [id number].   9/05/2024 9:39:09 AM CDT

## 2024-09-05 NOTE — TELEPHONE ENCOUNTER
Patient comment: Can yall call this in to my new Mail in order pharmacy company which will be [facility]?  Their number is [phone].  I will no longer be using [facility].  Thanks so much!!     Previous Rx sent to wrong pharmacy.

## 2024-09-25 DIAGNOSIS — E03.9 ACQUIRED HYPOTHYROIDISM: Chronic | ICD-10-CM

## 2024-09-25 RX ORDER — LEVOTHYROXINE SODIUM 50 UG/1
50 TABLET ORAL
Qty: 90 TABLET | Refills: 3 | Status: SHIPPED | OUTPATIENT
Start: 2024-09-25

## 2024-09-25 NOTE — TELEPHONE ENCOUNTER
No care due was identified.  [facility] Embedded Care Due Messages. Reference number: [id number].   9/25/2024 10:56:39 AM CDT

## 2024-10-18 ENCOUNTER — PATIENT MESSAGE (OUTPATIENT)
Dept: FAMILY MEDICINE | Facility: CLINIC | Age: 64
End: 2024-10-18
Payer: MEDICARE

## 2024-10-21 ENCOUNTER — LAB VISIT (OUTPATIENT)
Dept: LAB | Facility: HOSPITAL | Age: 64
End: 2024-10-21
Attending: PHYSICIAN ASSISTANT
Payer: MEDICARE

## 2024-10-21 ENCOUNTER — TELEPHONE (OUTPATIENT)
Dept: FAMILY MEDICINE | Facility: CLINIC | Age: 64
End: 2024-10-21

## 2024-10-21 ENCOUNTER — TELEPHONE (OUTPATIENT)
Dept: FAMILY MEDICINE | Facility: CLINIC | Age: 64
End: 2024-10-21
Payer: MEDICARE

## 2024-10-21 ENCOUNTER — OFFICE VISIT (OUTPATIENT)
Dept: FAMILY MEDICINE | Facility: CLINIC | Age: 64
End: 2024-10-21
Payer: MEDICARE

## 2024-10-21 VITALS
WEIGHT: 165.88 LBS | HEART RATE: 82 BPM | DIASTOLIC BLOOD PRESSURE: 88 MMHG | BODY MASS INDEX: 29.39 KG/M2 | RESPIRATION RATE: 16 BRPM | HEIGHT: 63 IN | SYSTOLIC BLOOD PRESSURE: 143 MMHG

## 2024-10-21 DIAGNOSIS — N64.3 GALACTORRHEA: ICD-10-CM

## 2024-10-21 DIAGNOSIS — N64.4 BREAST PAIN: Primary | ICD-10-CM

## 2024-10-21 LAB — PROLACTIN SERPL IA-MCNC: 7.6 NG/ML (ref 5.2–26.5)

## 2024-10-21 PROCEDURE — 3061F NEG MICROALBUMINURIA REV: CPT | Mod: CPTII,S$GLB,, | Performed by: PHYSICIAN ASSISTANT

## 2024-10-21 PROCEDURE — 84146 ASSAY OF PROLACTIN: CPT | Performed by: PHYSICIAN ASSISTANT

## 2024-10-21 PROCEDURE — 3079F DIAST BP 80-89 MM HG: CPT | Mod: CPTII,S$GLB,, | Performed by: PHYSICIAN ASSISTANT

## 2024-10-21 PROCEDURE — 1160F RVW MEDS BY RX/DR IN RCRD: CPT | Mod: CPTII,S$GLB,, | Performed by: PHYSICIAN ASSISTANT

## 2024-10-21 PROCEDURE — 1101F PT FALLS ASSESS-DOCD LE1/YR: CPT | Mod: CPTII,S$GLB,, | Performed by: PHYSICIAN ASSISTANT

## 2024-10-21 PROCEDURE — 99999 PR PBB SHADOW E&M-EST. PATIENT-LVL IV: CPT | Mod: PBBFAC,,, | Performed by: PHYSICIAN ASSISTANT

## 2024-10-21 PROCEDURE — 3008F BODY MASS INDEX DOCD: CPT | Mod: CPTII,S$GLB,, | Performed by: PHYSICIAN ASSISTANT

## 2024-10-21 PROCEDURE — 1159F MED LIST DOCD IN RCRD: CPT | Mod: CPTII,S$GLB,, | Performed by: PHYSICIAN ASSISTANT

## 2024-10-21 PROCEDURE — 36415 COLL VENOUS BLD VENIPUNCTURE: CPT | Mod: PO | Performed by: PHYSICIAN ASSISTANT

## 2024-10-21 PROCEDURE — 3044F HG A1C LEVEL LT 7.0%: CPT | Mod: CPTII,S$GLB,, | Performed by: PHYSICIAN ASSISTANT

## 2024-10-21 PROCEDURE — 3066F NEPHROPATHY DOC TX: CPT | Mod: CPTII,S$GLB,, | Performed by: PHYSICIAN ASSISTANT

## 2024-10-21 PROCEDURE — 3288F FALL RISK ASSESSMENT DOCD: CPT | Mod: CPTII,S$GLB,, | Performed by: PHYSICIAN ASSISTANT

## 2024-10-21 PROCEDURE — 3077F SYST BP >= 140 MM HG: CPT | Mod: CPTII,S$GLB,, | Performed by: PHYSICIAN ASSISTANT

## 2024-10-21 PROCEDURE — 99214 OFFICE O/P EST MOD 30 MIN: CPT | Mod: S$GLB,,, | Performed by: PHYSICIAN ASSISTANT

## 2024-10-21 NOTE — PATIENT INSTRUCTIONS
Charles TORIBIO,     If you are due for any health screening(s) below please notify me so we can arrange them to be ordered and scheduled. Most healthy patients at your age complete them, but you are free to accept or refuse.     If you can't do it, I'll definitely understand. If you can, I'd certainly appreciate it!    Tests to Keep You Healthy    Mammogram: Met on 6/11/2024  Eye Exam: DUE  Colon Cancer Screening: DUE  Last HbA1c < 8 (08/21/2024): Yes      Its time for your colon cancer screening     Colorectal cancer is one of the leading causes of cancer death for men and women but it doesnt have to be. Screenings can prevent colorectal cancer or find it early enough to treat and cure the disease.     Our records indicate that you may be overdue for colon cancer screening. A colonoscopy or stool screening test can help identify patients at risk for developing colon cancer. Cancer screenings save lives, so schedule yours today to stay healthy.     A colonoscopy is the preferred test for detecting colon cancer. It is needed only once every 10 years if results are negative. While you are sedated, a flexible, lighted tube with a tiny camera is inserted into the rectum and advanced through the colon to look for cancers.     An alternative screening test that is used at home and returned to the lab may also be used. It detects hidden blood in bowel movements which could indicate cancer in the colon. If results are positive, you will need a colonoscopy to determine if the blood is a sign of cancer. This type of follow up (diagnostic) colonoscopy usually requires additional copays as required by your insurance provider.     If you recently had your colon cancer screening performed outside of [facility], please let your Health care team know so that they can update your health record. Please contact your PCP if you have any questions.    Your diabetic retinal eye exam is due     Diabetes is the #1 cause of  blindness in the [place] - early detection before signs or symptoms develop can prevent debilitating blindness.     Our records indicate that you may be overdue for your annual diabetic eye exam. Eye screening can help identify patients at risk for developing vision loss which is common in diabetes. This simple screening is an important step to keeping you healthy and preventing complications from diabetes.     This recommended diabetic eye exam should take place once per year and can prevent and treat diabetes complications in the eye before developing symptoms. This can be done with a special camera is used to take photographs of the back of your eye without having to dilate them, or you can see an eye doctor for a full dilated exam.     If you recently had your yearly diabetic eye exam performed outside of [facility], please let your Health care team know so that they can update your health record.

## 2024-10-21 NOTE — TELEPHONE ENCOUNTER
----- Message from Farida sent at 10/21/2024 11:17 AM CDT -----  Type:  Patient Returning Call    Who Called:pt  Who Left Message for Patient:?  Does the patient know what this is regarding?:return call  Would the patient rather a call back or a response via [employer]? call  Best Call Back Number:[phone]  Additional Information: .

## 2024-10-21 NOTE — TELEPHONE ENCOUNTER
Tita Díaz LPN at 10/21/2024  9:40 AM    Status: Signed   10/21/2024 10:56 AM   Unable to reach pt via phone. Left VM.

## 2024-10-21 NOTE — PROGRESS NOTES
Assessment/Plan:    Problem List Items Addressed This Visit    None  Visit Diagnoses       Breast pain    -  Primary    Relevant Orders    Mammo Digital Diagnostic Bilat with [facility]    US Breast Bilateral Limited    Galactorrhea        Relevant Orders    Mammo Digital Diagnostic Bilat with [facility]    US Breast Bilateral Limited    Prolactin        -bilateral breast pain and nipple discharge   -breast exam unremarkable  -will obtain diagnostic MMG and US  -also checking a prolactin level  -recommend OTC Tylenol/Ibuprofen for pain  -ER precautions for severe or worsening of symptoms     Follow up if symptoms worsen or fail to improve.    Haliee Blankenship PA-C  _____________________________________________________________________________________________________________________________________________________    CC: breast pain    HPI: Patient is in clinic today as an established patient here for breast pain    BREAST CONCERNS:  She reports bilateral breast pain for approximately two months, with the right breast being more severely affected. The pain is intermittent, coming and going throughout the day. She also notes recent onset of nipple discharge, more prominent in the right breast. The discharge occurs intermittently, and she is unable to determine its color or consistency as she notices it on her clothing. She denies fever, lumps or skin changes. She has a history of dense breasts on previous mammograms. Her last mammogram in June was normal. She denies any history of breast issues such as abnormal lumps or cancers. She previously used hormone replacement therapy, which she discontinued approximately two years ago. She denies any family history of breast issues or cancers.     No other complaints today.     Past Medical History:   Diagnosis Date    Anticoagulant long-term use     Arthritis     Colon polyp     Diabetes     Thyroid disease      Past Surgical History:   Procedure Laterality Date    bilateral carpal  tunnel release      CAROTID ENDARTERECTOMY Left 10/29/2019    Procedure: ENDARTERECTOMY-CAROTID;  Surgeon: Bhanu Read MD;  Location: [facility] OR;  Service: Cardiovascular;  Laterality: Left;    COLONOSCOPY  2013         HIP SURGERY      JOINT REPLACEMENT Right     hip     Review of patient's allergies indicates:   Allergen Reactions    Metformin Nausea Only    Jardiance [empagliflozin] Itching    Sulfa (sulfonamide antibiotics)      Social History     Tobacco Use    Smoking status: Former     Current packs/day: 0.00     Types: Cigarettes     Quit date:      Years since quittin.8    Smokeless tobacco: Never   Substance Use Topics    Alcohol use: Yes     Comment: socially    Drug use: No     Family History   Problem Relation Name Age of Onset    Alzheimer's disease Mother Mom     Arthritis Mother Mom     Heart disease Father Dad         CAD    Diabetes Father Dad     Cancer Maternal Grandfather [place]     Anesthesia problems Neg Hx      Clotting disorder Neg Hx       Current Outpatient Medications on File Prior to Visit   Medication Sig Dispense Refill    atorvastatin (LIPITOR) 80 MG tablet Take 1 tablet (80 mg total) by mouth once daily. 90 tablet 0    levothyroxine (SYNTHROID) 50 MCG tablet Take 1 tablet (50 mcg total) by mouth before breakfast. 90 tablet 3    liothyronine (CYTOMEL) 5 MCG Tab TAKE 1 TABLET BY MOUTH ONCE  DAILY 90 tablet 0    pioglitazone (ACTOS) 15 MG tablet Take 1 tablet (15 mg total) by mouth once daily. 90 tablet 1    tirzepatide (MOUNJARO) 2.5 mg/0.5 mL PnIj Inject 2.5 mg into the skin every 7 days. 2 mL 5    aspirin (ECOTRIN) 81 MG EC tablet Take 1 tablet (81 mg total) by mouth once daily. (Patient taking differently: Take 81 mg by mouth every evening. PER MD INSTRUCTIONS) 90 tablet 3     No current facility-administered medications on file prior to visit.       Review of Systems   Constitutional:  Negative for chills, diaphoresis, fatigue and fever.   HENT:  Negative for  "congestion, ear pain, postnasal drip, sinus pain and sore throat.    Eyes:  Negative for pain and redness.   Respiratory:  Negative for cough, chest tightness and shortness of breath.         +breast pain and nipple discharge   Cardiovascular:  Negative for chest pain and leg swelling.   Gastrointestinal:  Negative for abdominal pain, constipation, diarrhea, nausea and vomiting.   Genitourinary:  Negative for dysuria and hematuria.   Musculoskeletal:  Negative for arthralgias and joint swelling.   Skin:  Negative for rash.   Neurological:  Negative for dizziness, syncope and headaches.   Psychiatric/Behavioral:  Negative for dysphoric mood. The patient is not nervous/anxious.        Vitals:    10/21/24 0940   BP: (!) 143/88   Pulse: 82   Resp: 16   Weight: 75.3 kg (165 lb 14.4 oz)   Height: 5' 3" (1.6 m)       Wt Readings from Last 3 Encounters:   10/21/24 75.3 kg (165 lb 14.4 oz)   08/29/24 75.8 kg (167 lb)   06/05/23 74.7 kg (164 lb 9.6 oz)       Physical Exam  Constitutional:       General: She is not in acute distress.     Appearance: Normal appearance. She is well-developed.   HENT:      Head: Normocephalic and atraumatic.   Eyes:      Conjunctiva/sclera: Conjunctivae normal.   Cardiovascular:      Rate and Rhythm: Normal rate and regular rhythm.      Pulses: Normal pulses.      Heart sounds: Normal heart sounds. No murmur heard.  Pulmonary:      Effort: Pulmonary effort is normal. No respiratory distress.      Breath sounds: Normal breath sounds.   Chest:   Breasts:     Right: Normal. No swelling, bleeding, inverted nipple, mass, nipple discharge, skin change or tenderness.      Left: Normal. No swelling, bleeding, inverted nipple, mass, nipple discharge, skin change or tenderness.   Abdominal:      General: Bowel sounds are normal. There is no distension.      Palpations: Abdomen is soft.      Tenderness: There is no abdominal tenderness.   Musculoskeletal:         General: Normal range of motion.      " Cervical back: Normal range of motion and neck supple.   Skin:     General: Skin is warm and dry.      Findings: No rash.   Neurological:      General: No focal deficit present.      Mental Status: She is alert and oriented to person, place, and time.   Psychiatric:         Mood and Affect: Mood normal.         Behavior: Behavior normal.         Health Maintenance   Topic Date Due    TETANUS VACCINE  Never done    Aspirin/Antiplatelet Therapy  Never done    Shingles Vaccine (1 of 2) Never done    Colorectal Cancer Screening  12/17/2018    Eye Exam  04/12/2023    Hemoglobin A1c  02/21/2025    Mammogram  06/11/2025    Lipid Panel  08/21/2025    Foot Exam  08/29/2025    High Dose Statin  10/21/2025    DEXA Scan  06/27/2027    Hepatitis C Screening  Completed     DISCLAIMER: This note was compiled by using a speech recognition dictation system and therefore please be aware that typographical / speech recognition errors can and do occur.  Please contact me if you see any errors specifically.  Consent was obtained for [employer] recording system prior to the visit.

## 2024-10-21 NOTE — TELEPHONE ENCOUNTER
----- Message from Hailee Blankenship PA-C sent at 10/21/2024 10:51 AM CDT -----  BP recheck? See if she can send us a home reading if not.

## 2024-10-22 NOTE — PROGRESS NOTES
Results have been released via [employer]. Please verify that these have been viewed by patient. If not, please call patient with results.     I have sent a message to them with the following interpretation (see below).    I have reviewed your recent blood work.     Prolactin level is normal.     Please do not hesitate to call or message with any additional questions or concerns.    Hailee Blankenship PA-C

## 2024-10-23 ENCOUNTER — TELEPHONE (OUTPATIENT)
Dept: FAMILY MEDICINE | Facility: CLINIC | Age: 64
End: 2024-10-23
Payer: MEDICARE

## 2024-10-23 ENCOUNTER — HOSPITAL ENCOUNTER (OUTPATIENT)
Dept: RADIOLOGY | Facility: HOSPITAL | Age: 64
Discharge: HOME | End: 2024-10-23
Attending: PHYSICIAN ASSISTANT
Payer: MEDICARE

## 2024-10-23 DIAGNOSIS — N64.4 BREAST PAIN: ICD-10-CM

## 2024-10-23 DIAGNOSIS — N64.4 BREAST PAIN: Primary | ICD-10-CM

## 2024-10-23 DIAGNOSIS — N64.3 GALACTORRHEA: ICD-10-CM

## 2024-10-23 PROCEDURE — 77066 DX MAMMO INCL CAD BI: CPT | Mod: 26,,, | Performed by: RADIOLOGY

## 2024-10-23 PROCEDURE — 77062 BREAST TOMOSYNTHESIS BI: CPT | Mod: 26,,, | Performed by: RADIOLOGY

## 2024-10-23 PROCEDURE — 77062 BREAST TOMOSYNTHESIS BI: CPT | Mod: TC,PO

## 2024-10-23 PROCEDURE — 77066 DX MAMMO INCL CAD BI: CPT | Mod: TC,PO

## 2024-10-23 NOTE — TELEPHONE ENCOUNTER
Please let patient know that I have put in a referral to breast specialist.     I have signed for the following orders AND/OR meds.  Please call the patient and ask the patient to schedule the testing AND/OR inform about any medications that were sent. Medications have been sent to pharmacy listed below      Orders Placed This Encounter   Procedures    Ambulatory referral/consult to Breast Surgery     Standing Status:   Future     Standing Expiration Date:   11/23/2025     Referral Priority:   Routine     Referral Type:   Consultation     Referral Reason:   Specialty Services Required     Requested Specialty:   Breast Surgery     Number of Visits Requested:   1              [facility] - [place], [place] - [address]  [address] [address]  Phone: [phone] Fax: [phone]

## 2024-10-23 NOTE — PROGRESS NOTES
Normal mammogram, repeat in 1 year, results released through [employer]. Please verify that patient has viewed results. If not, please call patient with interpretation below:     I have reviewed the results of your mammogram and it appears that everything was read as normal.  Based on this, the radiologist has recommended that you recheck a mammogram in 1 year.

## 2024-10-23 NOTE — TELEPHONE ENCOUNTER
----- Message from Singulex sent at 10/23/2024  4:00 PM CDT -----  Type: Patient Returning Call       Who Called : Patient        Who Left Message for Patient :  EDITH ZURITA      Does the patient know what this is regarding : Regarding schedule an consult to Breast Surgery; please advise        Best Call Back Number : [phone]          Additional Information :

## 2024-11-13 ENCOUNTER — PATIENT MESSAGE (OUTPATIENT)
Dept: FAMILY MEDICINE | Facility: CLINIC | Age: 64
End: 2024-11-13
Payer: MEDICARE

## 2024-11-18 PROBLEM — N64.52 DISCHARGE FROM RIGHT NIPPLE: Status: ACTIVE | Noted: 2024-11-18

## 2024-11-20 ENCOUNTER — TELEPHONE (OUTPATIENT)
Dept: PRIMARY CARE CLINIC | Facility: CLINIC | Age: 64
End: 2024-11-20
Payer: MEDICARE

## 2024-11-20 ENCOUNTER — OFFICE VISIT (OUTPATIENT)
Dept: PRIMARY CARE CLINIC | Facility: CLINIC | Age: 64
End: 2024-11-20
Payer: MEDICARE

## 2024-11-20 DIAGNOSIS — E11.9 TYPE 2 DIABETES MELLITUS WITHOUT COMPLICATION, WITHOUT LONG-TERM CURRENT USE OF INSULIN: Primary | ICD-10-CM

## 2024-11-20 PROCEDURE — 3044F HG A1C LEVEL LT 7.0%: CPT | Mod: CPTII,95,, | Performed by: INTERNAL MEDICINE

## 2024-11-20 PROCEDURE — 1101F PT FALLS ASSESS-DOCD LE1/YR: CPT | Mod: CPTII,95,, | Performed by: INTERNAL MEDICINE

## 2024-11-20 PROCEDURE — 3288F FALL RISK ASSESSMENT DOCD: CPT | Mod: CPTII,95,, | Performed by: INTERNAL MEDICINE

## 2024-11-20 PROCEDURE — 3066F NEPHROPATHY DOC TX: CPT | Mod: CPTII,95,, | Performed by: INTERNAL MEDICINE

## 2024-11-20 PROCEDURE — 3061F NEG MICROALBUMINURIA REV: CPT | Mod: CPTII,95,, | Performed by: INTERNAL MEDICINE

## 2024-11-20 PROCEDURE — 99213 OFFICE O/P EST LOW 20 MIN: CPT | Mod: 95,,, | Performed by: INTERNAL MEDICINE

## 2024-11-20 RX ORDER — TIRZEPATIDE 5 MG/.5ML
5 INJECTION, SOLUTION SUBCUTANEOUS
Qty: 4 PEN | Refills: 5 | Status: SHIPPED | OUTPATIENT
Start: 2024-11-20

## 2024-11-20 NOTE — TELEPHONE ENCOUNTER
Can you please print out pt's jury duty letter in her chart from today and leave it at the ? Thank you.

## 2024-11-25 NOTE — PROGRESS NOTES
Primary Care Telemedicine Note  The patient location is:  Patient Home   The chief complaint leading to consultation is: documentation/excuse  Total time spent with patient: 10 min    Visit type: Virtual visit with synchronous audio and video  Each patient to whom he or she provides medical services by telemedicine is:  (1) informed of the relationship between the physician and patient and the respective role of any other health care provider with respect to management of the patient; and (2) notified that he or she may decline to receive medical services by telemedicine and may withdraw from such care at any time.    Assessment/Plan:    Problem List Items Addressed This Visit          Endocrine    Type 2 diabetes mellitus without complication, without long-term current use of insulin - Primary    Overview     Diabetes Medications               tirzepatide (MOUNJARO) 2.5 mg/0.5 mL PnIj Inject 2.5 mg into the skin every 7 days.     -condition is currently controlled  -plan to increase Mounjaro dose to help with further weight loss  -AE with Jardiance and metformin  -see diabetic health maintenance listed below  -on statin: Yes  -on ACE-I/ARB: No  -counseling provided on importance of diabetic diet and medication compliance in order to treat diabetes  -discussed diabetes disease course and potential complications  -Discussed risks and benefits of GLP1a therapy.  Reviewed patient's family history and personal history for thyroid C cell type tumor and MEN syndrome.  Patient denies a history of these disorders or syndromes.  Patient was aware of increased risk of pancreatitis and worsening of these conditions in  animal studies.  Also discussed side effects of nausea vomiting diarrhea and abdominal pain.  Patient should notify me immediately if having any of these symptoms.  ER precautions were given.         Relevant Medications    tirzepatide (MOUNJARO) 5 mg/0.5 mL PnTru Narayan  MD  _____________________________________________________________________________________________________________________________________________________    HPI:    Patient is an established patient who presents today via virtual visit.    History of Present Illness  The patient is a 64-year-old female presenting virtually for documentation.    She received a jury duty notice but is unable to drive interstate. She has requested a excuse for jury duty to be left at the  for her to collect.    She has expressed interest in increasing her Mounjaro dosage, as she feels she is not losing weight as expected. She has lost approximately 4 pounds and is tolerating the 2.5 dosage well. She has requested that her prescription be sent to [facility].    Recent recorded blood pressure at breast surgeon's clinic was elevated but she reports that she was nervous for the visit. She had an MRI of the breast on 11/06/2024 which was negative. She has checked BP since that visit and it has remained within normal ranges.     No other new complaints today.      Past Medical History:  Past Medical History:   Diagnosis Date    Anticoagulant long-term use     Arthritis     Colon polyp     Diabetes     Thyroid disease        Current Outpatient Medications on File Prior to Visit   Medication Sig Dispense Refill    aspirin (ECOTRIN) 81 MG EC tablet Take 1 tablet (81 mg total) by mouth once daily. 90 tablet 3    atorvastatin (LIPITOR) 80 MG tablet Take 1 tablet (80 mg total) by mouth once daily. 90 tablet 0    levothyroxine (SYNTHROID) 50 MCG tablet Take 1 tablet (50 mcg total) by mouth before breakfast. 90 tablet 3    liothyronine (CYTOMEL) 5 MCG Tab TAKE 1 TABLET BY MOUTH ONCE  DAILY 90 tablet 0     No current facility-administered medications on file prior to visit.       Review of Systems   Constitutional:  Negative for chills, fever and malaise/fatigue.   HENT:  Negative for hearing loss.    Eyes:  Negative for discharge.    Respiratory:  Negative for cough, shortness of breath and wheezing.    Cardiovascular:  Negative for chest pain, palpitations and leg swelling.   Gastrointestinal:  Negative for abdominal pain, blood in stool, constipation, diarrhea and vomiting.   Genitourinary:  Negative for dysuria, frequency and hematuria.   Musculoskeletal:  Negative for back pain, joint pain and neck pain.   Neurological:  Negative for weakness and headaches.   Endo/Heme/Allergies:  Negative for polydipsia.   Psychiatric/Behavioral:  Negative for depression. The patient is not nervous/anxious.          Physical Exam   There were no vitals filed for this visit.   .FLOWAMB[14   BMI Readings from Last 1 Encounters:   11/18/24 28.70 kg/m²       Physical Exam  Constitutional:       General: She is not in acute distress.     Appearance: She is well-developed.   HENT:      Head: Normocephalic and atraumatic.   Pulmonary:      Effort: Pulmonary effort is normal. No respiratory distress.   Abdominal:      General: There is no distension.   Musculoskeletal:         General: Normal range of motion.      Cervical back: Normal range of motion.   Neurological:      Mental Status: She is alert and oriented to person, place, and time.   Psychiatric:         Mood and Affect: Mood normal.         Behavior: Behavior normal.           DISCLAIMER: This note was compiled by using a speech recognition dictation system and therefore please be aware that typographical / speech recognition errors can and do occur.  Please contact me if you see any errors specifically.  Consent was obtained for [employer] recording system prior to the visit.  Answers submitted by the patient for this visit:  Review of Systems Questionnaire (Submitted on 11/15/2024)  activity change: No  unexpected weight change: No  rhinorrhea: No  trouble swallowing: No  visual disturbance: No  chest tightness: No  polyuria: No  difficulty urinating: No  menstrual problem: No  joint swelling:  No  arthralgias: No  confusion: No  dysphoric mood: No

## 2024-11-29 ENCOUNTER — CLINICAL SUPPORT (OUTPATIENT)
Dept: FAMILY MEDICINE | Facility: CLINIC | Age: 64
End: 2024-11-29
Payer: MEDICARE

## 2024-11-29 ENCOUNTER — LAB VISIT (OUTPATIENT)
Dept: LAB | Facility: HOSPITAL | Age: 64
End: 2024-11-29
Attending: INTERNAL MEDICINE
Payer: MEDICARE

## 2024-11-29 VITALS — HEART RATE: 92 BPM | DIASTOLIC BLOOD PRESSURE: 72 MMHG | SYSTOLIC BLOOD PRESSURE: 133 MMHG

## 2024-11-29 DIAGNOSIS — E11.9 TYPE 2 DIABETES MELLITUS WITHOUT COMPLICATION, WITHOUT LONG-TERM CURRENT USE OF INSULIN: ICD-10-CM

## 2024-11-29 DIAGNOSIS — Z01.30 BP CHECK: Primary | ICD-10-CM

## 2024-11-29 LAB
ESTIMATED AVG GLUCOSE: 117 MG/DL (ref 68–131)
HBA1C MFR BLD: 5.7 % (ref 4–5.6)

## 2024-11-29 PROCEDURE — 36415 COLL VENOUS BLD VENIPUNCTURE: CPT | Mod: PO | Performed by: INTERNAL MEDICINE

## 2024-11-29 PROCEDURE — 99999 PR PBB SHADOW E&M-EST. PATIENT-LVL II: CPT | Mod: PBBFAC,,,

## 2024-11-29 PROCEDURE — 83036 HEMOGLOBIN GLYCOSYLATED A1C: CPT | Performed by: INTERNAL MEDICINE

## 2024-12-03 NOTE — PROGRESS NOTES
Results have been released via [email or web address]. Please verify that these have been viewed by patient. If not, please call patient with results.    I have sent a message to them with the following interpretation (see below).    I have reviewed your recent results.    A1c continues to improve, now down to 5.7. Keep up the great work.    Please do not hesitate to call or message with any additional questions or concerns.    Tejal Narayan MD

## 2024-12-17 DIAGNOSIS — E03.9 ACQUIRED HYPOTHYROIDISM: Chronic | ICD-10-CM

## 2024-12-17 RX ORDER — LIOTHYRONINE SODIUM 5 UG/1
5 TABLET ORAL DAILY
Qty: 90 TABLET | Refills: 2 | Status: SHIPPED | OUTPATIENT
Start: 2024-12-17

## 2024-12-18 NOTE — TELEPHONE ENCOUNTER
Refill Decision Note   Jeannette Rahman  is requesting a refill authorization.  Brief Assessment and Rationale for Refill:  Approve     Medication Therapy Plan: Rx sent to pharmacy requested via patient portal.      Comments:     Note composed:6:42 PM 12/17/2024

## 2024-12-18 NOTE — TELEPHONE ENCOUNTER
No care due was identified.  [facility] Embedded Care Due Messages. Reference number: [id number].   12/17/2024 6:38:34 PM CST

## 2025-03-14 ENCOUNTER — PATIENT MESSAGE (OUTPATIENT)
Dept: FAMILY MEDICINE | Facility: CLINIC | Age: 65
End: 2025-03-14
Payer: MEDICARE

## 2025-04-09 DIAGNOSIS — E11.9 TYPE 2 DIABETES MELLITUS WITHOUT COMPLICATION, WITHOUT LONG-TERM CURRENT USE OF INSULIN: ICD-10-CM

## 2025-04-09 RX ORDER — TIRZEPATIDE 5 MG/.5ML
INJECTION, SOLUTION SUBCUTANEOUS
Qty: 6 ML | Refills: 0 | Status: SHIPPED | OUTPATIENT
Start: 2025-04-09

## 2025-04-09 NOTE — TELEPHONE ENCOUNTER
No care due was identified.  Health [facility] Embedded Care Due Messages. Reference number: [id number].   4/09/2025 1:56:02 AM CDT

## 2025-04-09 NOTE — TELEPHONE ENCOUNTER
Refill Decision Note   Jeannette Rahman  is requesting a refill authorization.  Brief Assessment and Rationale for Refill:  Approve     Medication Therapy Plan:         Comments:     Note composed:10:30 AM 04/09/2025

## 2025-05-01 DIAGNOSIS — E03.9 ACQUIRED HYPOTHYROIDISM: Chronic | ICD-10-CM

## 2025-05-02 RX ORDER — LEVOTHYROXINE SODIUM 50 UG/1
50 TABLET ORAL
Qty: 90 TABLET | Refills: 1 | Status: SHIPPED | OUTPATIENT
Start: 2025-05-02

## 2025-05-02 NOTE — TELEPHONE ENCOUNTER
No care due was identified.  [facility] Embedded Care Due Messages. Reference number: [id number].   5/02/2025 7:28:07 AM CDT

## 2025-05-02 NOTE — TELEPHONE ENCOUNTER
Refill Decision Note   Jeannette Rahman  is requesting a refill authorization.  Brief Assessment and Rationale for Refill:  Approve     Medication Therapy Plan:        Comments:     Note composed:7:33 AM 05/02/2025

## 2025-05-29 ENCOUNTER — PATIENT OUTREACH (OUTPATIENT)
Dept: ADMINISTRATIVE | Facility: HOSPITAL | Age: 65
End: 2025-05-29
Payer: MEDICARE

## 2025-06-05 ENCOUNTER — PATIENT MESSAGE (OUTPATIENT)
Dept: PRIMARY CARE CLINIC | Facility: CLINIC | Age: 65
End: 2025-06-05
Payer: MEDICARE

## 2025-06-18 DIAGNOSIS — E11.9 TYPE 2 DIABETES MELLITUS WITHOUT COMPLICATION, WITHOUT LONG-TERM CURRENT USE OF INSULIN: ICD-10-CM

## 2025-06-18 RX ORDER — TIRZEPATIDE 5 MG/.5ML
INJECTION, SOLUTION SUBCUTANEOUS
Qty: 12 ML | Refills: 1 | Status: SHIPPED | OUTPATIENT
Start: 2025-06-18

## 2025-06-18 NOTE — TELEPHONE ENCOUNTER
Care Due:                  Date            Visit Type   Department     Provider  --------------------------------------------------------------------------------                                ESTABLISHED                              PATIENT -    [facility]  Last Visit: 11-      [facility]           Tejal Narayan  Next Visit: None Scheduled  None         None Found                                                            Last  Test          Frequency    Reason                     Performed    Due Date  --------------------------------------------------------------------------------    TSH.........  12 months..  levothyroxine............  08- 08-    [facility] Embedded Care Due Messages. Reference number: [id number].   6/18/2025 1:33:38 AM CDT

## 2025-06-18 NOTE — TELEPHONE ENCOUNTER
Refill Routing Note   Medication(s) are not appropriate for processing by [facility] for the following reason(s):        Required labs outdated    [facility] action(s):  Defer     Requires labs : Yes             Appointments  past 12m or future 3m with PCP    Date Provider   Last Visit   11/20/2024 Tejal Narayan MD   Next Visit   Visit date not found Tejal Narayan MD   ED visits in past 90 days: 0        Note composed:9:44 AM 06/18/2025

## 2025-06-26 DIAGNOSIS — E78.5 HYPERLIPIDEMIA, UNSPECIFIED HYPERLIPIDEMIA TYPE: ICD-10-CM

## 2025-06-26 RX ORDER — ATORVASTATIN CALCIUM 80 MG/1
80 TABLET, FILM COATED ORAL
Qty: 90 TABLET | Refills: 0 | Status: SHIPPED | OUTPATIENT
Start: 2025-06-26

## 2025-06-26 NOTE — TELEPHONE ENCOUNTER
Refill Decision Note   Jeannette Rahman  is requesting a refill authorization.  Brief Assessment and Rationale for Refill:  Approve     Medication Therapy Plan:         Comments:     Note composed:12:05 PM 06/26/2025

## 2025-07-29 DIAGNOSIS — E03.9 ACQUIRED HYPOTHYROIDISM: Chronic | ICD-10-CM

## 2025-07-29 NOTE — TELEPHONE ENCOUNTER
No care due was identified.  Health [facility] Embedded Care Due Messages. Reference number: [id number].   7/29/2025 12:35:38 PM CDT

## 2025-07-29 NOTE — TELEPHONE ENCOUNTER
Refill Routing Note   Medication(s) are not appropriate for processing by [facility] for the following reason(s):        New or recently adjusted medication    [facility] action(s):  Defer             Appointments  past 12m or future 3m with PCP    Date Provider   Last Visit   11/20/2024 Tejal Narayan MD   Next Visit   Visit date not found Tejal Narayan MD   ED visits in past 90 days: 0        Note composed:3:53 PM 07/29/2025

## 2025-07-30 RX ORDER — LEVOTHYROXINE SODIUM 50 UG/1
50 TABLET ORAL
Qty: 90 TABLET | Refills: 3 | Status: SHIPPED | OUTPATIENT
Start: 2025-07-30

## 2025-08-19 ENCOUNTER — PATIENT MESSAGE (OUTPATIENT)
Dept: ADMINISTRATIVE | Facility: HOSPITAL | Age: 65
End: 2025-08-19
Payer: MEDICARE

## 2025-08-27 DIAGNOSIS — E11.9 TYPE 2 DIABETES MELLITUS WITHOUT COMPLICATION: ICD-10-CM

## 2025-09-02 LAB
LEFT EYE DM RETINOPATHY: NEGATIVE
RIGHT EYE DM RETINOPATHY: NEGATIVE

## 2025-09-03 ENCOUNTER — PATIENT OUTREACH (OUTPATIENT)
Dept: ADMINISTRATIVE | Facility: HOSPITAL | Age: 65
End: 2025-09-03
Payer: MEDICARE